# Patient Record
Sex: MALE | Race: WHITE | NOT HISPANIC OR LATINO
[De-identification: names, ages, dates, MRNs, and addresses within clinical notes are randomized per-mention and may not be internally consistent; named-entity substitution may affect disease eponyms.]

---

## 2017-01-04 ENCOUNTER — MEDICATION RENEWAL (OUTPATIENT)
Age: 62
End: 2017-01-04

## 2017-03-09 ENCOUNTER — APPOINTMENT (OUTPATIENT)
Dept: ENDOCRINOLOGY | Facility: CLINIC | Age: 62
End: 2017-03-09

## 2017-03-09 VITALS
OXYGEN SATURATION: 97 % | HEART RATE: 86 BPM | HEIGHT: 70 IN | BODY MASS INDEX: 32.64 KG/M2 | WEIGHT: 228 LBS | SYSTOLIC BLOOD PRESSURE: 126 MMHG | DIASTOLIC BLOOD PRESSURE: 70 MMHG

## 2017-03-15 ENCOUNTER — RX RENEWAL (OUTPATIENT)
Age: 62
End: 2017-03-15

## 2017-04-04 ENCOUNTER — RX RENEWAL (OUTPATIENT)
Age: 62
End: 2017-04-04

## 2017-05-09 ENCOUNTER — RX RENEWAL (OUTPATIENT)
Age: 62
End: 2017-05-09

## 2017-05-11 ENCOUNTER — APPOINTMENT (OUTPATIENT)
Dept: ENDOCRINOLOGY | Facility: CLINIC | Age: 62
End: 2017-05-11

## 2017-05-12 ENCOUNTER — APPOINTMENT (OUTPATIENT)
Dept: INTERNAL MEDICINE | Facility: CLINIC | Age: 62
End: 2017-05-12

## 2017-05-12 DIAGNOSIS — D18.01 HEMANGIOMA OF SKIN AND SUBCUTANEOUS TISSUE: ICD-10-CM

## 2017-06-01 ENCOUNTER — MEDICATION RENEWAL (OUTPATIENT)
Age: 62
End: 2017-06-01

## 2017-07-17 ENCOUNTER — MEDICATION RENEWAL (OUTPATIENT)
Age: 62
End: 2017-07-17

## 2017-07-24 ENCOUNTER — MEDICATION RENEWAL (OUTPATIENT)
Age: 62
End: 2017-07-24

## 2017-07-24 RX ORDER — LABETALOL HYDROCHLORIDE 200 MG/1
200 TABLET, FILM COATED ORAL 3 TIMES DAILY
Qty: 270 | Refills: 3 | Status: DISCONTINUED | COMMUNITY
Start: 2017-05-12 | End: 2017-07-24

## 2017-10-04 LAB
ALBUMIN SERPL ELPH-MCNC: 4.8 G/DL
ALP BLD-CCNC: 58 U/L
ALT SERPL-CCNC: 32 U/L
ANION GAP SERPL CALC-SCNC: 15 MMOL/L
APPEARANCE: CLEAR
AST SERPL-CCNC: 19 U/L
BACTERIA: NEGATIVE
BASOPHILS # BLD AUTO: 0.06 K/UL
BASOPHILS NFR BLD AUTO: 0.6 %
BILIRUB SERPL-MCNC: 0.5 MG/DL
BILIRUBIN URINE: NEGATIVE
BLOOD URINE: NEGATIVE
BUN SERPL-MCNC: 13 MG/DL
CALCIUM SERPL-MCNC: 10 MG/DL
CHLORIDE SERPL-SCNC: 102 MMOL/L
CHOLEST SERPL-MCNC: 125 MG/DL
CHOLEST/HDLC SERPL: 3.8 RATIO
CO2 SERPL-SCNC: 24 MMOL/L
COLOR: YELLOW
CREAT SERPL-MCNC: 1.06 MG/DL
EOSINOPHIL # BLD AUTO: 0.3 K/UL
EOSINOPHIL NFR BLD AUTO: 2.9 %
GLUCOSE QUALITATIVE U: NEGATIVE MG/DL
GLUCOSE SERPL-MCNC: 166 MG/DL
HCT VFR BLD CALC: 43.2 %
HDLC SERPL-MCNC: 33 MG/DL
HGB BLD-MCNC: 14.2 G/DL
HYALINE CASTS: 1 /LPF
IMM GRANULOCYTES NFR BLD AUTO: 0.3 %
KETONES URINE: NEGATIVE
LDLC SERPL CALC-MCNC: 66 MG/DL
LEUKOCYTE ESTERASE URINE: NEGATIVE
LYMPHOCYTES # BLD AUTO: 3.15 K/UL
LYMPHOCYTES NFR BLD AUTO: 30.9 %
MAN DIFF?: NORMAL
MCHC RBC-ENTMCNC: 28.2 PG
MCHC RBC-ENTMCNC: 32.9 GM/DL
MCV RBC AUTO: 85.7 FL
MICROSCOPIC-UA: NORMAL
MONOCYTES # BLD AUTO: 0.97 K/UL
MONOCYTES NFR BLD AUTO: 9.5 %
NEUTROPHILS # BLD AUTO: 5.7 K/UL
NEUTROPHILS NFR BLD AUTO: 55.8 %
NITRITE URINE: NEGATIVE
PH URINE: 5
PLATELET # BLD AUTO: 329 K/UL
POTASSIUM SERPL-SCNC: 4.7 MMOL/L
PROT SERPL-MCNC: 7.5 G/DL
PROTEIN URINE: NEGATIVE MG/DL
RBC # BLD: 5.04 M/UL
RBC # FLD: 13.7 %
RED BLOOD CELLS URINE: 5 /HPF
SODIUM SERPL-SCNC: 141 MMOL/L
SPECIFIC GRAVITY URINE: 1.02
SQUAMOUS EPITHELIAL CELLS: 0 /HPF
TRIGL SERPL-MCNC: 131 MG/DL
UROBILINOGEN URINE: NEGATIVE MG/DL
WBC # FLD AUTO: 10.21 K/UL
WHITE BLOOD CELLS URINE: 1 /HPF

## 2017-10-05 ENCOUNTER — APPOINTMENT (OUTPATIENT)
Dept: INTERNAL MEDICINE | Facility: CLINIC | Age: 62
End: 2017-10-05
Payer: COMMERCIAL

## 2017-10-05 VITALS
BODY MASS INDEX: 32.93 KG/M2 | OXYGEN SATURATION: 97 % | SYSTOLIC BLOOD PRESSURE: 134 MMHG | RESPIRATION RATE: 16 BRPM | DIASTOLIC BLOOD PRESSURE: 80 MMHG | WEIGHT: 230 LBS | HEART RATE: 80 BPM | HEIGHT: 70 IN | TEMPERATURE: 98 F

## 2017-10-05 DIAGNOSIS — Z86.69 PERSONAL HISTORY OF OTHER DISEASES OF THE NERVOUS SYSTEM AND SENSE ORGANS: ICD-10-CM

## 2017-10-05 DIAGNOSIS — Z86.73 PERSONAL HISTORY OF TRANSIENT ISCHEMIC ATTACK (TIA), AND CEREBRAL INFARCTION W/OUT RESIDUAL DEFICITS: ICD-10-CM

## 2017-10-05 DIAGNOSIS — I61.3 NONTRAUMATIC INTRACEREBRAL HEMORRHAGE IN BRAIN STEM: ICD-10-CM

## 2017-10-05 DIAGNOSIS — R63.5 ABNORMAL WEIGHT GAIN: ICD-10-CM

## 2017-10-05 DIAGNOSIS — Z82.49 FAMILY HISTORY OF ISCHEMIC HEART DISEASE AND OTHER DISEASES OF THE CIRCULATORY SYSTEM: ICD-10-CM

## 2017-10-05 LAB
25(OH)D3 SERPL-MCNC: 24.6 NG/ML
HBA1C MFR BLD HPLC: 8.2 %
PSA SERPL-MCNC: 0.35 NG/ML
TSH SERPL-ACNC: 2.59 UIU/ML

## 2017-10-05 PROCEDURE — 99214 OFFICE O/P EST MOD 30 MIN: CPT

## 2017-10-05 RX ORDER — HYDROCHLOROTHIAZIDE 25 MG/1
25 TABLET ORAL DAILY
Qty: 30 | Refills: 3 | Status: DISCONTINUED | COMMUNITY
Start: 2017-03-09 | End: 2017-10-05

## 2017-10-19 ENCOUNTER — MEDICATION RENEWAL (OUTPATIENT)
Age: 62
End: 2017-10-19

## 2017-10-19 ENCOUNTER — APPOINTMENT (OUTPATIENT)
Dept: ENDOCRINOLOGY | Facility: CLINIC | Age: 62
End: 2017-10-19
Payer: COMMERCIAL

## 2017-10-19 VITALS
HEART RATE: 83 BPM | SYSTOLIC BLOOD PRESSURE: 120 MMHG | WEIGHT: 230 LBS | DIASTOLIC BLOOD PRESSURE: 70 MMHG | BODY MASS INDEX: 32.93 KG/M2 | HEIGHT: 70 IN | OXYGEN SATURATION: 98 %

## 2017-10-19 PROCEDURE — 99214 OFFICE O/P EST MOD 30 MIN: CPT

## 2017-10-23 ENCOUNTER — MEDICATION RENEWAL (OUTPATIENT)
Age: 62
End: 2017-10-23

## 2017-11-01 ENCOUNTER — RX RENEWAL (OUTPATIENT)
Age: 62
End: 2017-11-01

## 2018-02-01 ENCOUNTER — APPOINTMENT (OUTPATIENT)
Dept: INTERNAL MEDICINE | Facility: CLINIC | Age: 63
End: 2018-02-01
Payer: COMMERCIAL

## 2018-02-01 VITALS
RESPIRATION RATE: 16 BRPM | OXYGEN SATURATION: 97 % | HEART RATE: 78 BPM | WEIGHT: 229 LBS | TEMPERATURE: 99 F | DIASTOLIC BLOOD PRESSURE: 76 MMHG | BODY MASS INDEX: 32.78 KG/M2 | SYSTOLIC BLOOD PRESSURE: 140 MMHG | HEIGHT: 70 IN

## 2018-02-01 VITALS — SYSTOLIC BLOOD PRESSURE: 120 MMHG | DIASTOLIC BLOOD PRESSURE: 70 MMHG

## 2018-02-01 DIAGNOSIS — Z28.21 IMMUNIZATION NOT CARRIED OUT BECAUSE OF PATIENT REFUSAL: ICD-10-CM

## 2018-02-01 PROCEDURE — 99214 OFFICE O/P EST MOD 30 MIN: CPT

## 2018-02-01 RX ORDER — METFORMIN HYDROCHLORIDE 500 MG/1
500 TABLET, COATED ORAL
Qty: 180 | Refills: 0 | Status: DISCONTINUED | COMMUNITY
Start: 2017-06-01

## 2018-02-22 ENCOUNTER — APPOINTMENT (OUTPATIENT)
Dept: ENDOCRINOLOGY | Facility: CLINIC | Age: 63
End: 2018-02-22
Payer: COMMERCIAL

## 2018-02-22 VITALS
SYSTOLIC BLOOD PRESSURE: 122 MMHG | HEIGHT: 70 IN | HEART RATE: 79 BPM | OXYGEN SATURATION: 98 % | BODY MASS INDEX: 33.21 KG/M2 | DIASTOLIC BLOOD PRESSURE: 82 MMHG | WEIGHT: 232 LBS

## 2018-02-22 LAB
GLUCOSE BLDC GLUCOMTR-MCNC: 148
HBA1C MFR BLD HPLC: 7

## 2018-02-22 PROCEDURE — 82962 GLUCOSE BLOOD TEST: CPT

## 2018-02-22 PROCEDURE — 83036 HEMOGLOBIN GLYCOSYLATED A1C: CPT | Mod: QW

## 2018-02-22 PROCEDURE — 99214 OFFICE O/P EST MOD 30 MIN: CPT | Mod: 25

## 2018-03-19 ENCOUNTER — RX RENEWAL (OUTPATIENT)
Age: 63
End: 2018-03-19

## 2018-04-16 ENCOUNTER — RX RENEWAL (OUTPATIENT)
Age: 63
End: 2018-04-16

## 2018-04-23 ENCOUNTER — RX RENEWAL (OUTPATIENT)
Age: 63
End: 2018-04-23

## 2018-05-07 ENCOUNTER — RX RENEWAL (OUTPATIENT)
Age: 63
End: 2018-05-07

## 2018-05-14 ENCOUNTER — RX RENEWAL (OUTPATIENT)
Age: 63
End: 2018-05-14

## 2018-06-18 ENCOUNTER — RX RENEWAL (OUTPATIENT)
Age: 63
End: 2018-06-18

## 2018-07-19 ENCOUNTER — APPOINTMENT (OUTPATIENT)
Dept: ENDOCRINOLOGY | Facility: CLINIC | Age: 63
End: 2018-07-19

## 2018-07-25 ENCOUNTER — RX RENEWAL (OUTPATIENT)
Age: 63
End: 2018-07-25

## 2018-07-31 ENCOUNTER — RX RENEWAL (OUTPATIENT)
Age: 63
End: 2018-07-31

## 2018-10-01 ENCOUNTER — RX RENEWAL (OUTPATIENT)
Age: 63
End: 2018-10-01

## 2018-10-08 ENCOUNTER — RX RENEWAL (OUTPATIENT)
Age: 63
End: 2018-10-08

## 2018-11-08 ENCOUNTER — APPOINTMENT (OUTPATIENT)
Dept: ENDOCRINOLOGY | Facility: CLINIC | Age: 63
End: 2018-11-08
Payer: COMMERCIAL

## 2018-11-08 VITALS
BODY MASS INDEX: 33.79 KG/M2 | DIASTOLIC BLOOD PRESSURE: 80 MMHG | HEART RATE: 91 BPM | WEIGHT: 236 LBS | SYSTOLIC BLOOD PRESSURE: 120 MMHG | HEIGHT: 70 IN | OXYGEN SATURATION: 97 %

## 2018-11-08 LAB
GLUCOSE BLDC GLUCOMTR-MCNC: 250
HBA1C MFR BLD HPLC: 9.8

## 2018-11-08 PROCEDURE — 99214 OFFICE O/P EST MOD 30 MIN: CPT | Mod: 25

## 2018-11-08 PROCEDURE — 82962 GLUCOSE BLOOD TEST: CPT

## 2018-11-08 PROCEDURE — 83036 HEMOGLOBIN GLYCOSYLATED A1C: CPT | Mod: QW

## 2018-11-08 RX ORDER — BLOOD-GLUCOSE METER
W/DEVICE EACH MISCELLANEOUS
Qty: 1 | Refills: 0 | Status: ACTIVE | COMMUNITY
Start: 2018-11-08 | End: 1900-01-01

## 2018-11-08 RX ORDER — BLOOD SUGAR DIAGNOSTIC
STRIP MISCELLANEOUS
Qty: 1 | Refills: 3 | Status: ACTIVE | COMMUNITY
Start: 2018-11-08 | End: 1900-01-01

## 2018-12-05 ENCOUNTER — RX RENEWAL (OUTPATIENT)
Age: 63
End: 2018-12-05

## 2018-12-17 ENCOUNTER — RX RENEWAL (OUTPATIENT)
Age: 63
End: 2018-12-17

## 2019-02-20 ENCOUNTER — RX RENEWAL (OUTPATIENT)
Age: 64
End: 2019-02-20

## 2019-02-21 ENCOUNTER — APPOINTMENT (OUTPATIENT)
Dept: ENDOCRINOLOGY | Facility: CLINIC | Age: 64
End: 2019-02-21

## 2019-04-16 ENCOUNTER — RX RENEWAL (OUTPATIENT)
Age: 64
End: 2019-04-16

## 2019-05-06 ENCOUNTER — RX RENEWAL (OUTPATIENT)
Age: 64
End: 2019-05-06

## 2019-05-16 ENCOUNTER — RX RENEWAL (OUTPATIENT)
Age: 64
End: 2019-05-16

## 2019-05-23 ENCOUNTER — RX RENEWAL (OUTPATIENT)
Age: 64
End: 2019-05-23

## 2019-06-17 ENCOUNTER — RX RENEWAL (OUTPATIENT)
Age: 64
End: 2019-06-17

## 2019-06-18 ENCOUNTER — RX RENEWAL (OUTPATIENT)
Age: 64
End: 2019-06-18

## 2019-07-11 ENCOUNTER — APPOINTMENT (OUTPATIENT)
Dept: ENDOCRINOLOGY | Facility: CLINIC | Age: 64
End: 2019-07-11
Payer: COMMERCIAL

## 2019-07-11 ENCOUNTER — RESULT CHARGE (OUTPATIENT)
Age: 64
End: 2019-07-11

## 2019-07-11 VITALS
BODY MASS INDEX: 32.65 KG/M2 | HEIGHT: 70 IN | OXYGEN SATURATION: 97 % | DIASTOLIC BLOOD PRESSURE: 90 MMHG | WEIGHT: 228.05 LBS | HEART RATE: 80 BPM | SYSTOLIC BLOOD PRESSURE: 144 MMHG

## 2019-07-11 LAB
GLUCOSE BLDC GLUCOMTR-MCNC: 155
HBA1C MFR BLD HPLC: 8.8

## 2019-07-11 PROCEDURE — 83036 HEMOGLOBIN GLYCOSYLATED A1C: CPT | Mod: QW

## 2019-07-11 PROCEDURE — 99214 OFFICE O/P EST MOD 30 MIN: CPT | Mod: 25

## 2019-07-11 PROCEDURE — 82962 GLUCOSE BLOOD TEST: CPT

## 2019-07-12 LAB
ALBUMIN SERPL ELPH-MCNC: 5.1 G/DL
ALP BLD-CCNC: 54 U/L
ALT SERPL-CCNC: 33 U/L
ANION GAP SERPL CALC-SCNC: 16 MMOL/L
AST SERPL-CCNC: 18 U/L
BILIRUB SERPL-MCNC: 0.5 MG/DL
BUN SERPL-MCNC: 18 MG/DL
CALCIUM SERPL-MCNC: 10.3 MG/DL
CHLORIDE SERPL-SCNC: 102 MMOL/L
CO2 SERPL-SCNC: 24 MMOL/L
CREAT SERPL-MCNC: 0.89 MG/DL
CREAT SPEC-SCNC: 61 MG/DL
GLUCOSE SERPL-MCNC: 137 MG/DL
MICROALBUMIN 24H UR DL<=1MG/L-MCNC: 3.2 MG/DL
MICROALBUMIN/CREAT 24H UR-RTO: 52 MG/G
POTASSIUM SERPL-SCNC: 4.6 MMOL/L
PROT SERPL-MCNC: 7.3 G/DL
SODIUM SERPL-SCNC: 142 MMOL/L

## 2019-07-12 NOTE — PHYSICAL EXAM
[Alert] : alert [No Acute Distress] : no acute distress [No Respiratory Distress] : no respiratory distress [Normal Rate and Effort] : normal respiratory rhythm and effort [Clear to Auscultation] : lungs were clear to auscultation bilaterally [Normal S1, S2] : normal S1 and S2 [Normal Rate] : heart rate was normal  [Regular Rhythm] : with a regular rhythm [Normal Bowel Sounds] : normal bowel sounds [Not Tender] : non-tender [Soft] : abdomen soft [Not Distended] : not distended [Right Foot Was Examined] : right foot ~C was examined [Left Foot Was Examined] : left foot ~C was examined [Normal] : normal [2+] : 2+ in the dorsalis pedis [Diminished Throughout Both Feet] : normal tactile sensation with monofilament testing throughout both feet

## 2019-07-12 NOTE — ASSESSMENT
[FreeTextEntry1] : 62 yo male with hx of t2dm uncontrolled (HbA1C 8.8%) with hx of CVA here for f/u.\par 1) T2DM: Continue trulicity 1.5mg po sq qweekly and metformin to 850mg po bid.\par Increase farxiga 10mg po qdaily.\par Test daily.\par Stop eating after 8pm.\par Labs/optho up to date.\par 2) HTN: BP slightly above goal. Continue Norvasc/hydralazine/labetalol.\par 3) Dyslipidemia: continue statin therapy.\par 4) Vit D Insufficiency: start Vit D 1000mg po qdaily. \par Pt declines labs today. He will have them when he sees his PCP Dr. Reyes, but I need to know if his CrCl is good so he will do a CMP and urine microalbumin.\par Return in 4 months.

## 2019-07-12 NOTE — HISTORY OF PRESENT ILLNESS
[FreeTextEntry1] : 64 yo male with t2dm controlled diagnosed last year (Hb A1C is 8.8%) with cva here for hospital f/u. He is currently takes farxiga 5mg po qdaily, trulicity 1.5mg sq qweekly and metformin 850mg po bid. He is inquiring about nucentrix which he got as an ad on facebook.\par Last saw optho 4/19 - no "damage to eyes". No blurred vision/diplolpia. No cp/palpations/Sob. \par \par In December, he lost his garza retriever of 13 years and goes back to work 3x/week as a . His son has decided to go to law school at [employer] starting next month and his other son is in the academy for [employer]. Granddaughter is 2 now!\par \par

## 2019-07-12 NOTE — ADDENDUM
[FreeTextEntry1] : 7/12 2PM: left a msg that his renal function is normal and he has small amounts of protein in the urine but I don't have a previous one for comparison.

## 2019-07-22 ENCOUNTER — RX RENEWAL (OUTPATIENT)
Age: 64
End: 2019-07-22

## 2019-10-06 ENCOUNTER — RX RENEWAL (OUTPATIENT)
Age: 64
End: 2019-10-06

## 2019-11-21 ENCOUNTER — APPOINTMENT (OUTPATIENT)
Dept: ENDOCRINOLOGY | Facility: CLINIC | Age: 64
End: 2019-11-21
Payer: COMMERCIAL

## 2019-11-21 VITALS
OXYGEN SATURATION: 97 % | WEIGHT: 226 LBS | SYSTOLIC BLOOD PRESSURE: 140 MMHG | HEART RATE: 81 BPM | DIASTOLIC BLOOD PRESSURE: 82 MMHG | HEIGHT: 70 IN | BODY MASS INDEX: 32.35 KG/M2

## 2019-11-21 LAB
GLUCOSE BLDC GLUCOMTR-MCNC: 263
HBA1C MFR BLD HPLC: 7.8

## 2019-11-21 PROCEDURE — 82962 GLUCOSE BLOOD TEST: CPT

## 2019-11-21 PROCEDURE — 99214 OFFICE O/P EST MOD 30 MIN: CPT | Mod: 25

## 2019-11-21 PROCEDURE — 83036 HEMOGLOBIN GLYCOSYLATED A1C: CPT | Mod: QW

## 2019-11-22 LAB
ALBUMIN SERPL ELPH-MCNC: 5.2 G/DL
ALP BLD-CCNC: 61 U/L
ALT SERPL-CCNC: 29 U/L
ANION GAP SERPL CALC-SCNC: 16 MMOL/L
AST SERPL-CCNC: 18 U/L
BILIRUB SERPL-MCNC: 0.2 MG/DL
BUN SERPL-MCNC: 18 MG/DL
CALCIUM SERPL-MCNC: 10.5 MG/DL
CHLORIDE SERPL-SCNC: 101 MMOL/L
CHOLEST SERPL-MCNC: 139 MG/DL
CHOLEST/HDLC SERPL: 4 RATIO
CO2 SERPL-SCNC: 26 MMOL/L
CREAT SERPL-MCNC: 0.99 MG/DL
CREAT SPEC-SCNC: 48 MG/DL
GLUCOSE SERPL-MCNC: 217 MG/DL
HDLC SERPL-MCNC: 35 MG/DL
LDLC SERPL CALC-MCNC: 63 MG/DL
MICROALBUMIN 24H UR DL<=1MG/L-MCNC: <1.2 MG/DL
MICROALBUMIN/CREAT 24H UR-RTO: NORMAL MG/G
POTASSIUM SERPL-SCNC: 4.7 MMOL/L
PROT SERPL-MCNC: 7.5 G/DL
SODIUM SERPL-SCNC: 143 MMOL/L
TRIGL SERPL-MCNC: 204 MG/DL
TSH SERPL-ACNC: 2.12 UIU/ML

## 2019-11-22 NOTE — ASSESSMENT
[FreeTextEntry1] : 65 yo male with hx of t2dm uncontrolled (HbA1C 7.8%) with hx of CVA here for f/u.\par 1) T2DM: Continue trulicity 1.5mg po sq qweekly, metformin to 850mg po bid, and Farxiga 10mg po qdaily.\par Test daily.\par Last saw optho 4/19 but need the report: Dr. Monroy\par Refused flu shot.\par 2) HTN: BP slightly above goal at 140/82. Will reach out to his PCP Dr. Reyes. Continue to take:  Norvasc/hydralazine/labetalol/lisinopril. \par 3) Dyslipidemia: continue statin therapy.\par Yearly labs done today.\par Return in 4 months.

## 2019-11-22 NOTE — ADDENDUM
[FreeTextEntry1] : 11/22/19 1130am called patient to review labs:\par 1) Lipids are good. TG are elevated to 205 but he ate at 9am and had labs about noon so not truly fasting.\par 2) Normal kidney and liver function\par 3) Serum glucose elevated as was protein, would continue to watch - had been 5.1 in July and now 5.2 - will alert Dr. Reyes to this.\par

## 2019-11-22 NOTE — HISTORY OF PRESENT ILLNESS
"Chief Complaint   Patient presents with     RECHECK     results and cpap f/u       Initial There were no vitals taken for this visit. Estimated body mass index is 43.27 kg/(m^2) as calculated from the following:    Height as of 12/28/16: 1.651 m (5' 5\").    Weight as of 12/28/16: 117.9 kg (260 lb).  Medication Reconciliation: complete    " [FreeTextEntry1] : 65 yo male with t2dm controlled diagnosed last year (Hb A1C is 7.8%) with cva here for hospital f/u. He is currently takes farxiga 10mg po qdaily, trulicity 1.5mg sq qweekly and metformin 850mg po bid. He does not test. He feels that the farxiga has helped him with better glycemic control. Weight has decreased by 10 pounds since 11/18.\par He denies dysuria or hematuria.\par He has completed his temporary  job 3x/week. He His older son is now in [employer] school and is younger son is now a [employer] . Granddaughter is 2.5 now! They now have a 75 pound Luis Retriever puppy Annie which will keep him busy.

## 2020-01-31 ENCOUNTER — RX RENEWAL (OUTPATIENT)
Age: 65
End: 2020-01-31

## 2020-02-26 ENCOUNTER — EMERGENCY (EMERGENCY)
Facility: HOSPITAL | Age: 65
LOS: 1 days | Discharge: HOME | End: 2020-02-26
Attending: EMERGENCY MEDICINE | Admitting: EMERGENCY MEDICINE
Payer: COMMERCIAL

## 2020-02-26 VITALS
SYSTOLIC BLOOD PRESSURE: 154 MMHG | HEIGHT: 70 IN | HEART RATE: 73 BPM | DIASTOLIC BLOOD PRESSURE: 90 MMHG | TEMPERATURE: 97 F | OXYGEN SATURATION: 97 % | WEIGHT: 214.95 LBS | RESPIRATION RATE: 18 BRPM

## 2020-02-26 DIAGNOSIS — Z85.828 PERSONAL HISTORY OF OTHER MALIGNANT NEOPLASM OF SKIN: Chronic | ICD-10-CM

## 2020-02-26 PROCEDURE — 12001 RPR S/N/AX/GEN/TRNK 2.5CM/<: CPT

## 2020-02-26 PROCEDURE — 12001 RPR S/N/AX/GEN/TRNK 2.5CM/<: CPT | Mod: F5

## 2020-02-26 PROCEDURE — 99283 EMERGENCY DEPT VISIT LOW MDM: CPT | Mod: 25

## 2020-02-26 PROCEDURE — 90715 TDAP VACCINE 7 YRS/> IM: CPT

## 2020-02-26 PROCEDURE — 90471 IMMUNIZATION ADMIN: CPT

## 2020-02-26 RX ORDER — TETANUS TOXOID, REDUCED DIPHTHERIA TOXOID AND ACELLULAR PERTUSSIS VACCINE, ADSORBED 5; 2.5; 8; 8; 2.5 [IU]/.5ML; [IU]/.5ML; UG/.5ML; UG/.5ML; UG/.5ML
0.5 SUSPENSION INTRAMUSCULAR ONCE
Refills: 0 | Status: COMPLETED | OUTPATIENT
Start: 2020-02-26 | End: 2020-02-26

## 2020-02-26 RX ADMIN — Medication 1 TABLET(S): at 09:27

## 2020-02-26 RX ADMIN — TETANUS TOXOID, REDUCED DIPHTHERIA TOXOID AND ACELLULAR PERTUSSIS VACCINE, ADSORBED 0.5 MILLILITER(S): 5; 2.5; 8; 8; 2.5 SUSPENSION INTRAMUSCULAR at 09:27

## 2020-02-26 NOTE — ED ADULT NURSE NOTE - OBJECTIVE STATEMENT
Pt presents to ED from home with c/o lacerations to right thumb. Pt states he cut his finger on the garage door. Denies any blood thinners. Bleeding controlled.

## 2020-02-26 NOTE — ED PROVIDER NOTE - PATIENT PORTAL LINK FT
You can access the FollowMyHealth Patient Portal offered by [facility] by registering at the following website: [email or web address]. By joining [facility]’s FollowMyHealth portal, you will also be able to view your health information using other applications (apps) compatible with our system.

## 2020-02-26 NOTE — ED PROVIDER NOTE - NSFOLLOWUPINSTRUCTIONS_ED_ALL_ED_FT
1.Follow up with your PMD within 48-72 hours for wound check.   2. Keep sutures clean and dry for 24 hours then clean with soap and water daily.    3. Apply bacitracin and cover.   4. Return to the emergency department for suture removal in 7 days.   5. Return to the ED for increased pain, surrounding redness, streaking (red lines), pus, drainage from wound, swelling, fever, chills OR ANY NEW CONCERNING symptoms.   6. You received a Tdap (tetanus, diphtheria and pertussis) shot today, which can make your arm sore.    Finger Laceration    WHAT YOU NEED TO KNOW:    What is a finger laceration? A finger laceration is a deep cut in your skin. Your blood vessels, bones, joints, tendons, or nerves may also be injured.    What are the signs and symptoms of a finger laceration? Your symptoms may depend on whether nerves, tendons, or deeper tissues were injured. You may have any of the following:     A cut, tear, or gash in your finger      Bleeding, swelling, or pain      Numbness or tingling in your finger      Trouble moving your finger    How is a finger laceration diagnosed? Tell your healthcare provider how you got your laceration. Your healthcare provider will examine your laceration and decide what treatment you need. An x-ray, ultrasound, or CT may show foreign objects in the wound. Foreign objects include metal, gravel, and glass. The tests may also show damage to deeper tissues. You may be given contrast liquid to help the injured area show up better in the pictures. Tell the healthcare provider if you have ever had an allergic reaction to contrast liquid.    How is a finger laceration treated? Treatment depends on how large and deep the laceration is. It also depends on whether you have damage to deeper tissues. You may need any of the following:     Pressure may be applied to stop any bleeding.      Wound cleaning may be needed to remove dirt or debris. This will decrease the risk of infection. Your healthcare provider may need to look in your laceration for foreign objects or damage to deeper tissues. Before your laceration is cleaned and checked, you may be given medicine to numb the area. You may also be given medicine to help you relax.      Wound closure with stitches, medical glue, or Steri-Strips™ may be needed. These help the wound close and heal. A splint may be placed over your stitches, glue, or Steri-Strips. This will help decrease stress on the wound and prevent it from coming apart.           Medicine may be given to treat pain or decrease your risk for infection. You may also be given a tetanus shot. Your healthcare provider will decide if you need a tetanus shot. Wounds at high risk for tetanus infection include wounds with dirt or saliva in them. Tell your healthcare provider if you have had the tetanus vaccine or a booster within the last 5 years.      Surgery may be needed to clean your wound and remove foreign objects. Surgery may also be needed to repair injuries to tendons, nerves, or bones.    What can I do to manage my symptoms?     Apply ice on your finger for 15 to 20 minutes every hour or as directed. Use an ice pack, or put crushed ice in a plastic bag. Cover it with a towel before you apply it to your skin. Ice helps prevent tissue damage and decreases swelling and pain.      Elevate your hand above the level of your heart as often as you can. This will help decrease swelling and pain. Prop your hand on pillows or blankets to keep it elevated comfortably.      Wear your splint as directed. A splint will decrease movement and stress on your wound. The splint may help your wound heal faster. Ask your healthcare provider how to apply and remove a splint.      Apply ointments to decrease scarring. Do not apply ointments until your healthcare provider says it is okay. You may need to wait until your wound is healed. Ask which ointment to buy and how often to use it.    When should I seek immediate care?     Your wound comes apart.      Blood soaks through your bandage.      You have severe pain in your finger or hand.      Your finger is pale and cold.      You have sudden trouble moving your finger.      Your swelling suddenly gets worse.      You have red streaks on your skin coming from your wound.    When should I call my doctor or hand specialist?     You have new numbness or tingling.      Your finger feels warm, looks swollen or red, and is draining pus.      You have a fever.      You have questions or concerns about your condition or care.    CARE AGREEMENT:    You have the right to help plan your care. Learn about your health condition and how it may be treated. Discuss treatment options with your healthcare providers to decide what care you want to receive. You always have the right to refuse treatment.

## 2020-02-26 NOTE — ED PROVIDER NOTE - PHYSICAL EXAMINATION
laceration- 2.5 cm laceration to distal 1st right finger, tendon intact, positive ROM, positive strength and sensation

## 2020-02-26 NOTE — ED PROCEDURE NOTE - ATTENDING CONTRIBUTION TO CARE
Dr. Cabrera: I performed a face to face bedside interview with patient regarding history of present illness, review of symptoms and past medical history. I completed an independent physical exam.  I have discussed patient's plan of care with PA.   I agree with note as stated above, having amended the EMR as needed to reflect my findings.   This includes HISTORY OF PRESENT ILLNESS, HIV, PAST MEDICAL/SURGICAL/FAMILY/SOCIAL HISTORY, ALLERGIES AND HOME MEDICATIONS, REVIEW OF SYSTEMS, PHYSICAL EXAM, and any PROGRESS NOTES during the time I functioned as the attending physician for this patient.

## 2020-02-26 NOTE — ED PROVIDER NOTE - OBJECTIVE STATEMENT
64 yr old male with hx of CVA, DM, HTN presents with laceration to right thumb, s/p cutting finger on garage door this morning, prior to arrival. Tetanus over 10 years ago. No other injuries. Right hand dominant.

## 2020-02-26 NOTE — ED PROVIDER NOTE - CLINICAL SUMMARY MEDICAL DECISION MAKING FREE TEXT BOX
64 yr old male with hx of CVA, DM, HTN presents with laceration to right thumb, s/p cutting finger on garage door this morning, prior to arrival. Tetanus over 10 years ago. No other injuries. Right hand dominant.  laceration- 2.5 cm laceration to distal 1st right finger, tendon intact, positive ROM, positive strength and sensation   wound care, sutures placed, pt tolerated, stable for dc with Augmentin, fu in 7 days for suture removal. tetanus given.

## 2020-02-26 NOTE — ED PROVIDER NOTE - ATTENDING CONTRIBUTION TO CARE
Dr. Cabrera: I performed a face to face bedside interview with patient regarding history of present illness, review of symptoms and past medical history. I completed an independent physical exam.  I have discussed patient's plan of care with PA.   I agree with note as stated above, having amended the EMR as needed to reflect my findings.   This includes HISTORY OF PRESENT ILLNESS, HIV, PAST MEDICAL/SURGICAL/FAMILY/SOCIAL HISTORY, ALLERGIES AND HOME MEDICATIONS, REVIEW OF SYSTEMS, PHYSICAL EXAM, and any PROGRESS NOTES during the time I functioned as the attending physician for this patient.    64M h/o DM, right hand dominant, tdap not utd, p/w lac to right thumb s/p garage door, no compression injury. On exam pt is well appearing, nad, 2 cm lac over mid part of right thumb, neurovasc intact. Lac repaired, tdap updated and augmentin given.

## 2020-02-26 NOTE — ED ADULT TRIAGE NOTE - TEMPERATURE IN FAHRENHEIT (DEGREES F)
Date Performed: 08/16/2017       Time Performed: 12:00:18

 

DOCTOR:      Kameron Ma 

 

DRUG LIST:     

CLINICAL HISTORY:     

REASON FOR TEST:     

REASON FOR ENDING:     

OBSERVATION:     

CONCLUSION:      Lexiscan stress test was performed under standard four minute protocol.  Radionuclid
e was injected one minute prior to ending the test. No electrocardiographic abormalities were present
 to suggest ischemia. Nuclear imaging and interpretation are pending.

COMMENTS: 97.4 05-May-2018 20:44

## 2020-02-29 PROBLEM — I63.9 CEREBRAL INFARCTION, UNSPECIFIED: Chronic | Status: ACTIVE | Noted: 2020-02-26

## 2020-02-29 PROBLEM — E11.9 TYPE 2 DIABETES MELLITUS WITHOUT COMPLICATIONS: Chronic | Status: ACTIVE | Noted: 2020-02-26

## 2020-03-02 DIAGNOSIS — S61.011A LACERATION WITHOUT FOREIGN BODY OF RIGHT THUMB WITHOUT DAMAGE TO NAIL, INITIAL ENCOUNTER: ICD-10-CM

## 2020-03-06 ENCOUNTER — APPOINTMENT (OUTPATIENT)
Dept: INTERNAL MEDICINE | Facility: CLINIC | Age: 65
End: 2020-03-06
Payer: COMMERCIAL

## 2020-03-06 VITALS
BODY MASS INDEX: 32.35 KG/M2 | SYSTOLIC BLOOD PRESSURE: 140 MMHG | HEART RATE: 85 BPM | TEMPERATURE: 97.5 F | WEIGHT: 226 LBS | DIASTOLIC BLOOD PRESSURE: 70 MMHG | HEIGHT: 70 IN | OXYGEN SATURATION: 98 % | RESPIRATION RATE: 16 BRPM

## 2020-03-06 DIAGNOSIS — M19.049 PRIMARY OSTEOARTHRITIS, UNSPECIFIED HAND: ICD-10-CM

## 2020-03-06 DIAGNOSIS — Z00.00 ENCOUNTER FOR GENERAL ADULT MEDICAL EXAMINATION W/OUT ABNORMAL FINDINGS: ICD-10-CM

## 2020-03-06 DIAGNOSIS — S61.219A LACERATION W/OUT FOREIGN BODY OF UNSPECIFIED FINGER W/OUT DAMAGE TO NAIL, INITIAL ENCOUNTER: ICD-10-CM

## 2020-03-06 PROCEDURE — 99214 OFFICE O/P EST MOD 30 MIN: CPT

## 2020-03-06 NOTE — HISTORY OF PRESENT ILLNESS
[FreeTextEntry1] : Laceration ED Follow up \par HTN\par Needs labs [de-identified] : Right thumb laceration went to hospital \par -tetanus shot\par -stitches\par -

## 2020-03-21 DIAGNOSIS — R05 COUGH: ICD-10-CM

## 2020-03-27 ENCOUNTER — INPATIENT (INPATIENT)
Facility: HOSPITAL | Age: 65
LOS: 1 days | Discharge: HOME | DRG: 871 | End: 2020-03-29
Attending: FAMILY MEDICINE | Admitting: FAMILY MEDICINE
Payer: COMMERCIAL

## 2020-03-27 VITALS
DIASTOLIC BLOOD PRESSURE: 61 MMHG | TEMPERATURE: 102 F | OXYGEN SATURATION: 94 % | SYSTOLIC BLOOD PRESSURE: 100 MMHG | WEIGHT: 212.08 LBS | HEART RATE: 94 BPM | HEIGHT: 70 IN | RESPIRATION RATE: 18 BRPM

## 2020-03-27 DIAGNOSIS — B34.9 VIRAL INFECTION, UNSPECIFIED: ICD-10-CM

## 2020-03-27 DIAGNOSIS — Z85.828 PERSONAL HISTORY OF OTHER MALIGNANT NEOPLASM OF SKIN: Chronic | ICD-10-CM

## 2020-03-27 LAB
ALBUMIN SERPL ELPH-MCNC: 3.2 G/DL — LOW (ref 3.3–5)
ALP SERPL-CCNC: 49 U/L — SIGNIFICANT CHANGE UP (ref 40–120)
ALT FLD-CCNC: 46 U/L — HIGH (ref 10–45)
ANION GAP SERPL CALC-SCNC: 16 MMOL/L — SIGNIFICANT CHANGE UP (ref 5–17)
APTT BLD: 52.1 SEC — SIGNIFICANT CHANGE UP (ref 27.5–36.3)
AST SERPL-CCNC: 37 U/L — SIGNIFICANT CHANGE UP (ref 10–40)
BASOPHILS # BLD AUTO: 0.03 K/UL — SIGNIFICANT CHANGE UP (ref 0–0.2)
BASOPHILS NFR BLD AUTO: 0.3 % — SIGNIFICANT CHANGE UP (ref 0–2)
BILIRUB SERPL-MCNC: 0.6 MG/DL — SIGNIFICANT CHANGE UP (ref 0.2–1.2)
BUN SERPL-MCNC: 34 MG/DL — HIGH (ref 7–23)
CALCIUM SERPL-MCNC: 9.2 MG/DL — SIGNIFICANT CHANGE UP (ref 8.4–10.5)
CHLORIDE SERPL-SCNC: 99 MMOL/L — SIGNIFICANT CHANGE UP (ref 96–108)
CO2 SERPL-SCNC: 19 MMOL/L — LOW (ref 22–31)
CREAT SERPL-MCNC: 1.55 MG/DL — HIGH (ref 0.5–1.3)
EOSINOPHIL # BLD AUTO: 0.02 K/UL — SIGNIFICANT CHANGE UP (ref 0–0.5)
EOSINOPHIL NFR BLD AUTO: 0.2 % — SIGNIFICANT CHANGE UP (ref 0–6)
GLUCOSE BLDC GLUCOMTR-MCNC: 121 MG/DL — HIGH (ref 70–99)
GLUCOSE SERPL-MCNC: 217 MG/DL — HIGH (ref 70–99)
HCT VFR BLD CALC: 42.7 % — SIGNIFICANT CHANGE UP (ref 39–50)
HGB BLD-MCNC: 13.9 G/DL — SIGNIFICANT CHANGE UP (ref 13–17)
IMM GRANULOCYTES NFR BLD AUTO: 0.7 % — SIGNIFICANT CHANGE UP (ref 0–1.5)
INR BLD: 1.28 RATIO — HIGH (ref 0.88–1.16)
LACTATE SERPL-SCNC: 1 MMOL/L — SIGNIFICANT CHANGE UP (ref 0.7–2)
LYMPHOCYTES # BLD AUTO: 19.8 % — SIGNIFICANT CHANGE UP (ref 13–44)
LYMPHOCYTES # BLD AUTO: 2.04 K/UL — SIGNIFICANT CHANGE UP (ref 1–3.3)
MCHC RBC-ENTMCNC: 28.1 PG — SIGNIFICANT CHANGE UP (ref 27–34)
MCHC RBC-ENTMCNC: 32.6 GM/DL — SIGNIFICANT CHANGE UP (ref 32–36)
MCV RBC AUTO: 86.4 FL — SIGNIFICANT CHANGE UP (ref 80–100)
MONOCYTES # BLD AUTO: 0.93 K/UL — HIGH (ref 0–0.9)
MONOCYTES NFR BLD AUTO: 9 % — SIGNIFICANT CHANGE UP (ref 2–14)
NEUTROPHILS # BLD AUTO: 7.23 K/UL — SIGNIFICANT CHANGE UP (ref 1.8–7.4)
NEUTROPHILS NFR BLD AUTO: 70 % — SIGNIFICANT CHANGE UP (ref 43–77)
NRBC # BLD: 0 /100 WBCS — SIGNIFICANT CHANGE UP (ref 0–0)
PLATELET # BLD AUTO: 365 K/UL — SIGNIFICANT CHANGE UP (ref 150–400)
POTASSIUM SERPL-MCNC: 4.5 MMOL/L — SIGNIFICANT CHANGE UP (ref 3.5–5.3)
POTASSIUM SERPL-SCNC: 4.5 MMOL/L — SIGNIFICANT CHANGE UP (ref 3.5–5.3)
PROT SERPL-MCNC: 7.9 G/DL — SIGNIFICANT CHANGE UP (ref 6–8.3)
PROTHROM AB SERPL-ACNC: 14.4 SEC — HIGH (ref 10–12.9)
RAPID RVP RESULT: SIGNIFICANT CHANGE UP
RBC # BLD: 4.94 M/UL — SIGNIFICANT CHANGE UP (ref 4.2–5.8)
RBC # FLD: 14 % — SIGNIFICANT CHANGE UP (ref 10.3–14.5)
SODIUM SERPL-SCNC: 134 MMOL/L — LOW (ref 135–145)
WBC # BLD: 10.32 K/UL — SIGNIFICANT CHANGE UP (ref 3.8–10.5)
WBC # FLD AUTO: 10.32 K/UL — SIGNIFICANT CHANGE UP (ref 3.8–10.5)

## 2020-03-27 PROCEDURE — 71045 X-RAY EXAM CHEST 1 VIEW: CPT | Mod: 26

## 2020-03-27 PROCEDURE — 99233 SBSQ HOSP IP/OBS HIGH 50: CPT

## 2020-03-27 PROCEDURE — 99285 EMERGENCY DEPT VISIT HI MDM: CPT

## 2020-03-27 PROCEDURE — 93010 ELECTROCARDIOGRAM REPORT: CPT

## 2020-03-27 PROCEDURE — 70450 CT HEAD/BRAIN W/O DYE: CPT | Mod: 26

## 2020-03-27 RX ORDER — ENOXAPARIN SODIUM 100 MG/ML
40 INJECTION SUBCUTANEOUS DAILY
Refills: 0 | Status: DISCONTINUED | OUTPATIENT
Start: 2020-03-27 | End: 2020-03-29

## 2020-03-27 RX ORDER — ACETAMINOPHEN 500 MG
650 TABLET ORAL EVERY 6 HOURS
Refills: 0 | Status: DISCONTINUED | OUTPATIENT
Start: 2020-03-27 | End: 2020-03-29

## 2020-03-27 RX ORDER — HYDRALAZINE HCL 50 MG
25 TABLET ORAL EVERY 12 HOURS
Refills: 0 | Status: DISCONTINUED | OUTPATIENT
Start: 2020-03-27 | End: 2020-03-29

## 2020-03-27 RX ORDER — INSULIN LISPRO 100/ML
VIAL (ML) SUBCUTANEOUS AT BEDTIME
Refills: 0 | Status: DISCONTINUED | OUTPATIENT
Start: 2020-03-27 | End: 2020-03-29

## 2020-03-27 RX ORDER — GLUCAGON INJECTION, SOLUTION 0.5 MG/.1ML
1 INJECTION, SOLUTION SUBCUTANEOUS ONCE
Refills: 0 | Status: DISCONTINUED | OUTPATIENT
Start: 2020-03-27 | End: 2020-03-29

## 2020-03-27 RX ORDER — HYDROXYCHLOROQUINE SULFATE 200 MG
400 TABLET ORAL EVERY 12 HOURS
Refills: 0 | Status: DISCONTINUED | OUTPATIENT
Start: 2020-03-27 | End: 2020-03-27

## 2020-03-27 RX ORDER — DEXTROSE 50 % IN WATER 50 %
15 SYRINGE (ML) INTRAVENOUS ONCE
Refills: 0 | Status: DISCONTINUED | OUTPATIENT
Start: 2020-03-27 | End: 2020-03-29

## 2020-03-27 RX ORDER — HYDROXYCHLOROQUINE SULFATE 200 MG
TABLET ORAL
Refills: 0 | Status: DISCONTINUED | OUTPATIENT
Start: 2020-03-27 | End: 2020-03-27

## 2020-03-27 RX ORDER — DEXTROSE 50 % IN WATER 50 %
12.5 SYRINGE (ML) INTRAVENOUS ONCE
Refills: 0 | Status: DISCONTINUED | OUTPATIENT
Start: 2020-03-27 | End: 2020-03-29

## 2020-03-27 RX ORDER — INSULIN GLARGINE 100 [IU]/ML
10 INJECTION, SOLUTION SUBCUTANEOUS EVERY MORNING
Refills: 0 | Status: DISCONTINUED | OUTPATIENT
Start: 2020-03-27 | End: 2020-03-29

## 2020-03-27 RX ORDER — DEXTROSE 50 % IN WATER 50 %
25 SYRINGE (ML) INTRAVENOUS ONCE
Refills: 0 | Status: DISCONTINUED | OUTPATIENT
Start: 2020-03-27 | End: 2020-03-29

## 2020-03-27 RX ORDER — AZITHROMYCIN 500 MG/1
500 TABLET, FILM COATED ORAL EVERY 24 HOURS
Refills: 0 | Status: DISCONTINUED | OUTPATIENT
Start: 2020-03-27 | End: 2020-03-29

## 2020-03-27 RX ORDER — SODIUM CHLORIDE 9 MG/ML
2300 INJECTION INTRAMUSCULAR; INTRAVENOUS; SUBCUTANEOUS ONCE
Refills: 0 | Status: COMPLETED | OUTPATIENT
Start: 2020-03-27 | End: 2020-03-27

## 2020-03-27 RX ORDER — CEFEPIME 1 G/1
2000 INJECTION, POWDER, FOR SOLUTION INTRAMUSCULAR; INTRAVENOUS ONCE
Refills: 0 | Status: COMPLETED | OUTPATIENT
Start: 2020-03-27 | End: 2020-03-27

## 2020-03-27 RX ORDER — SODIUM CHLORIDE 9 MG/ML
1000 INJECTION, SOLUTION INTRAVENOUS
Refills: 0 | Status: DISCONTINUED | OUTPATIENT
Start: 2020-03-27 | End: 2020-03-29

## 2020-03-27 RX ORDER — CEFTRIAXONE 500 MG/1
1000 INJECTION, POWDER, FOR SOLUTION INTRAMUSCULAR; INTRAVENOUS EVERY 24 HOURS
Refills: 0 | Status: DISCONTINUED | OUTPATIENT
Start: 2020-03-27 | End: 2020-03-29

## 2020-03-27 RX ORDER — AZITHROMYCIN 500 MG/1
500 TABLET, FILM COATED ORAL ONCE
Refills: 0 | Status: COMPLETED | OUTPATIENT
Start: 2020-03-27 | End: 2020-03-27

## 2020-03-27 RX ORDER — SODIUM CHLORIDE 9 MG/ML
1000 INJECTION, SOLUTION INTRAVENOUS
Refills: 0 | Status: DISCONTINUED | OUTPATIENT
Start: 2020-03-27 | End: 2020-03-28

## 2020-03-27 RX ORDER — INSULIN LISPRO 100/ML
VIAL (ML) SUBCUTANEOUS
Refills: 0 | Status: DISCONTINUED | OUTPATIENT
Start: 2020-03-27 | End: 2020-03-29

## 2020-03-27 RX ORDER — HYDROXYCHLOROQUINE SULFATE 200 MG
400 TABLET ORAL ONCE
Refills: 0 | Status: COMPLETED | OUTPATIENT
Start: 2020-03-27 | End: 2020-03-27

## 2020-03-27 RX ORDER — ACETAMINOPHEN 500 MG
975 TABLET ORAL ONCE
Refills: 0 | Status: COMPLETED | OUTPATIENT
Start: 2020-03-27 | End: 2020-03-27

## 2020-03-27 RX ADMIN — AZITHROMYCIN 500 MILLIGRAM(S): 500 TABLET, FILM COATED ORAL at 19:09

## 2020-03-27 RX ADMIN — Medication 975 MILLIGRAM(S): at 17:49

## 2020-03-27 RX ADMIN — CEFEPIME 100 MILLIGRAM(S): 1 INJECTION, POWDER, FOR SOLUTION INTRAMUSCULAR; INTRAVENOUS at 19:10

## 2020-03-27 RX ADMIN — Medication 400 MILLIGRAM(S): at 19:10

## 2020-03-27 RX ADMIN — Medication 2 MILLIGRAM(S): at 18:17

## 2020-03-27 RX ADMIN — SODIUM CHLORIDE 2300 MILLILITER(S): 9 INJECTION INTRAMUSCULAR; INTRAVENOUS; SUBCUTANEOUS at 19:00

## 2020-03-27 RX ADMIN — SODIUM CHLORIDE 2300 MILLILITER(S): 9 INJECTION INTRAMUSCULAR; INTRAVENOUS; SUBCUTANEOUS at 20:00

## 2020-03-27 RX ADMIN — CEFEPIME 2000 MILLIGRAM(S): 1 INJECTION, POWDER, FOR SOLUTION INTRAMUSCULAR; INTRAVENOUS at 19:40

## 2020-03-27 NOTE — ED ADULT NURSE NOTE - CADM POA CENTRAL LINE
Radha Yi, PT  Physical Therapist                    
No

## 2020-03-27 NOTE — ED PROVIDER NOTE - OBJECTIVE STATEMENT
65 y/o m with h/o CVA, DM pw fever, cough, generalized weakness and AMS gradually worsening over 4 days. Patient very confused on exam, agitated, having trouble answering questions, refusing blood work. As per wife she has noticed him becoming more altered over the past few days and irrational.   PMD- Dr. Reyes  No smoking hx

## 2020-03-27 NOTE — ED PROVIDER NOTE - CLINICAL SUMMARY MEDICAL DECISION MAKING FREE TEXT BOX
65 y/o m with h/o CVA, DM pw fever, cough, generalized weakness and AMS gradually worsening over 4 days. Patient very confused on exam, agitated, having trouble answering questions, refusing blood work. As per wife she has noticed him becoming more altered over the past few days and irrational. Meeting sepsis criteria upon arrival- Will obtain sepsis work up, check CT head, COVID and RVP testing, administer antipyretics, fluids and likely ADMIT 63 y/o m with h/o CVA, DM pw fever, cough, generalized weakness and AMS gradually worsening over 4 days. Patient very confused on exam, agitated, having trouble answering questions, refusing blood work. As per wife she has noticed him becoming more altered over the past few days and irrational. Meeting sepsis criteria upon arrival- Will obtain sepsis work up, check CT head, COVID and RVP testing, administer antipyretics, fluids and likely ADMIT  Update: CXR- multifocal Pneumonia COVID in appearance, ABX given. O2 Sat 96 % on 3 L NC. Desats to 89% on RA. Admission accepted by Dr. Whatley

## 2020-03-27 NOTE — ED ADULT NURSE NOTE - NSIMPLEMENTINTERV_GEN_ALL_ED
Implemented All Universal Safety Interventions:  Ira to call system. Call bell, personal items and telephone within reach. Instruct patient to call for assistance. Room bathroom lighting operational. Non-slip footwear when patient is off stretcher. Physically safe environment: no spills, clutter or unnecessary equipment. Stretcher in lowest position, wheels locked, appropriate side rails in place.

## 2020-03-27 NOTE — H&P ADULT - NSHPLABSRESULTS_GEN_ALL_CORE
13.9   10.32 )-----------( 365      ( 27 Mar 2020 18:55 )             42.7       03-27    134<L>  |  99  |  34<H>  ----------------------------<  217<H>  4.5   |  19<L>  |  1.55<H>    Ca    9.2      27 Mar 2020 18:55    TPro  7.9  /  Alb  3.2<L>  /  TBili  0.6  /  DBili  x   /  AST  37  /  ALT  46<H>  /  AlkPhos  49  03-27        PT/INR - ( 27 Mar 2020 18:55 )   PT: 14.4 sec;   INR: 1.28 ratio         PTT - ( 27 Mar 2020 18:55 )  PTT:52.1 sec    Lactate Trend  03-27 @ 18:55 Lactate:1.0           Labs reviewed:     CXR personally reviewed  < from: Xray Chest 1 View-PORTABLE IMMEDIATE (03.27.20 @ 17:18) >      EXAM:  XR CHEST PORTABLE IMMED 1V      PROCEDURE DATE:  03/27/2020        INTERPRETATION:  AP chest.    Clinical indications: Sepsis.    IMPRESSION: There are bilateral peripheral patchy opacities that are more prominent on the left compatible with multifocal pneumonia. The heart is normal in size. The bones are intact.  ECG reviewed and interpreted:       < from: CT Head No Cont (03.27.20 @ 17:46) >    IMPRESSION:    No acute intracranial findings.      If acute stroke is of clinical concern, MRI with diffusion-weighted images would be helpful for further characterization.    < end of copied text >

## 2020-03-27 NOTE — H&P ADULT - ASSESSMENT
63 y/o m with h/o CVA, DM2 ON TRULICITY AND METFORMIN, HTN, c/o cogh adn AMS, admitted for sepsis 2/2 pna WARNER COVID    ADMIT TO [facility]  am labs  rvp, covid sent from ED.   npo  rocephine  zithromax  plaquenil     HTN- bp boderline, will hold labetalol and enalapril  will c/w hydralazine q12 for now    dm2- lantus, iss, accuchecks    JAKE likely 2/2 sepsis- creat 1.5, baseline unknown,  will hold enalapril for now     d/w wife at the bedside

## 2020-03-27 NOTE — H&P ADULT - HISTORY OF PRESENT ILLNESS
65 y/o m with h/o CVA, DM2 ON TRULICITY AND METFORMIN, HTN, c/o  cough for last 2-3 days, and  generalized weakness, wife at bedside also has cough, noticed patient to be agitated today not making any sense, confused, in ed was refusing blood ,having trouble answering questions, found to have temp of 102. no n/v, no dysuria.

## 2020-03-27 NOTE — ED PROVIDER NOTE - CARE PLAN
Principal Discharge DX:	Viral syndrome Principal Discharge DX:	Viral syndrome  Secondary Diagnosis:	Pneumonia of both lungs due to infectious organism, unspecified part of lung

## 2020-03-27 NOTE — ED PROVIDER NOTE - ATTENDING CONTRIBUTION TO CARE
65 y/o m with h/o CVA, DM pw fever, cough, generalized weakness and AMS gradually worsening over 4 days. Patient very confused on exam, agitated, having trouble answering questions, refusing blood work. As per wife she has noticed him becoming more altered over the past few days and irrational. Meeting sepsis criteria upon arrival- Will obtain sepsis work up, check CT head, COVID and RVP testing, administer antipyretics, fluids and likely ADMIT  Update: CXR- multifocal Pneumonia COVID in appearance, ABX given. O2 Sat 96 % on 3 L NC. Desats to 89% on RA. Admission accepted by Dr. Chacorta Blum:  I have reviewed and discussed with the PA/ resident the case specifics, including the history, physical assessment, evaluation, conclusion, laboratory results, and medical plan. I agree with the contents, and conclusions. I have personally examined, and interviewed the patient.

## 2020-03-27 NOTE — H&P ADULT - NSHPPHYSICALEXAM_GEN_ALL_CORE
Vital Signs Last 24 Hrs  T(C): 37.1 (27 Mar 2020 19:25), Max: 38.9 (27 Mar 2020 16:31)  T(F): 98.8 (27 Mar 2020 19:25), Max: 102.1 (27 Mar 2020 16:31)  HR: 88 (27 Mar 2020 19:25) (88 - 95)  BP: 115/51 (27 Mar 2020 19:25) (100/61 - 123/66)  BP(mean): --  RR: 18 (27 Mar 2020 19:25) (18 - 18)  SpO2: 96% (27 Mar 2020 19:25) (94% - 96%)    GENERAL- NAD  EAR/NOSE/MOUTH/THROAT - no pharyngeal exudates, no oral lesions,  MMM  EYES- MICHAEL, conjunctiva and Sclera clear  NECK- supple  RESPIRATORY-  bi basilar rales  CARDIOVASCULAR - SIS2, RRR  GI - soft NT BS present  EXTREMITIES- no pedal edema  NEUROLOGY- no gross focal deficits  SKIN- no rashes, warm to touch  PSYCHIATRY- AAO X 0  MUSCULOSKELETAL- ROM normal

## 2020-03-28 LAB
ALBUMIN SERPL ELPH-MCNC: 2.7 G/DL — LOW (ref 3.3–5)
ALP SERPL-CCNC: 46 U/L — SIGNIFICANT CHANGE UP (ref 40–120)
ALT FLD-CCNC: 40 U/L — SIGNIFICANT CHANGE UP (ref 10–45)
ANION GAP SERPL CALC-SCNC: 15 MMOL/L — SIGNIFICANT CHANGE UP (ref 5–17)
AST SERPL-CCNC: 33 U/L — SIGNIFICANT CHANGE UP (ref 10–40)
BASOPHILS # BLD AUTO: 0.02 K/UL — SIGNIFICANT CHANGE UP (ref 0–0.2)
BASOPHILS NFR BLD AUTO: 0.2 % — SIGNIFICANT CHANGE UP (ref 0–2)
BILIRUB SERPL-MCNC: 0.4 MG/DL — SIGNIFICANT CHANGE UP (ref 0.2–1.2)
BUN SERPL-MCNC: 23 MG/DL — SIGNIFICANT CHANGE UP (ref 7–23)
CALCIUM SERPL-MCNC: 8.6 MG/DL — SIGNIFICANT CHANGE UP (ref 8.4–10.5)
CHLORIDE SERPL-SCNC: 105 MMOL/L — SIGNIFICANT CHANGE UP (ref 96–108)
CO2 SERPL-SCNC: 19 MMOL/L — LOW (ref 22–31)
CREAT SERPL-MCNC: 0.99 MG/DL — SIGNIFICANT CHANGE UP (ref 0.5–1.3)
EOSINOPHIL # BLD AUTO: 0.08 K/UL — SIGNIFICANT CHANGE UP (ref 0–0.5)
EOSINOPHIL NFR BLD AUTO: 0.9 % — SIGNIFICANT CHANGE UP (ref 0–6)
GLUCOSE SERPL-MCNC: 154 MG/DL — HIGH (ref 70–99)
HBA1C BLD-MCNC: 9.1 % — HIGH (ref 4–5.6)
HCT VFR BLD CALC: 39.7 % — SIGNIFICANT CHANGE UP (ref 39–50)
HCV AB S/CO SERPL IA: 0.22 S/CO — SIGNIFICANT CHANGE UP (ref 0–0.99)
HCV AB SERPL-IMP: SIGNIFICANT CHANGE UP
HGB BLD-MCNC: 12.9 G/DL — LOW (ref 13–17)
IMM GRANULOCYTES NFR BLD AUTO: 0.6 % — SIGNIFICANT CHANGE UP (ref 0–1.5)
INR BLD: 1.31 RATIO — HIGH (ref 0.88–1.16)
LYMPHOCYTES # BLD AUTO: 1.74 K/UL — SIGNIFICANT CHANGE UP (ref 1–3.3)
LYMPHOCYTES # BLD AUTO: 19.6 % — SIGNIFICANT CHANGE UP (ref 13–44)
MCHC RBC-ENTMCNC: 28.3 PG — SIGNIFICANT CHANGE UP (ref 27–34)
MCHC RBC-ENTMCNC: 32.5 GM/DL — SIGNIFICANT CHANGE UP (ref 32–36)
MCV RBC AUTO: 87.1 FL — SIGNIFICANT CHANGE UP (ref 80–100)
MONOCYTES # BLD AUTO: 0.78 K/UL — SIGNIFICANT CHANGE UP (ref 0–0.9)
MONOCYTES NFR BLD AUTO: 8.8 % — SIGNIFICANT CHANGE UP (ref 2–14)
NEUTROPHILS # BLD AUTO: 6.23 K/UL — SIGNIFICANT CHANGE UP (ref 1.8–7.4)
NEUTROPHILS NFR BLD AUTO: 69.9 % — SIGNIFICANT CHANGE UP (ref 43–77)
NRBC # BLD: 0 /100 WBCS — SIGNIFICANT CHANGE UP (ref 0–0)
PLATELET # BLD AUTO: 343 K/UL — SIGNIFICANT CHANGE UP (ref 150–400)
POTASSIUM SERPL-MCNC: 4.3 MMOL/L — SIGNIFICANT CHANGE UP (ref 3.5–5.3)
POTASSIUM SERPL-SCNC: 4.3 MMOL/L — SIGNIFICANT CHANGE UP (ref 3.5–5.3)
PROT SERPL-MCNC: 6.8 G/DL — SIGNIFICANT CHANGE UP (ref 6–8.3)
PROTHROM AB SERPL-ACNC: 14.8 SEC — HIGH (ref 10–12.9)
RBC # BLD: 4.56 M/UL — SIGNIFICANT CHANGE UP (ref 4.2–5.8)
RBC # FLD: 14 % — SIGNIFICANT CHANGE UP (ref 10.3–14.5)
SARS-COV-2 RNA SPEC QL NAA+PROBE: DETECTED
SODIUM SERPL-SCNC: 139 MMOL/L — SIGNIFICANT CHANGE UP (ref 135–145)
WBC # BLD: 8.9 K/UL — SIGNIFICANT CHANGE UP (ref 3.8–10.5)
WBC # FLD AUTO: 8.9 K/UL — SIGNIFICANT CHANGE UP (ref 3.8–10.5)

## 2020-03-28 PROCEDURE — 99223 1ST HOSP IP/OBS HIGH 75: CPT

## 2020-03-28 RX ADMIN — Medication 3: at 17:56

## 2020-03-28 RX ADMIN — Medication 2: at 12:38

## 2020-03-28 RX ADMIN — AZITHROMYCIN 255 MILLIGRAM(S): 500 TABLET, FILM COATED ORAL at 06:12

## 2020-03-28 RX ADMIN — ENOXAPARIN SODIUM 40 MILLIGRAM(S): 100 INJECTION SUBCUTANEOUS at 12:38

## 2020-03-28 RX ADMIN — SODIUM CHLORIDE 60 MILLILITER(S): 9 INJECTION, SOLUTION INTRAVENOUS at 06:11

## 2020-03-28 RX ADMIN — Medication 25 MILLIGRAM(S): at 06:13

## 2020-03-28 RX ADMIN — CEFTRIAXONE 100 MILLIGRAM(S): 500 INJECTION, POWDER, FOR SOLUTION INTRAMUSCULAR; INTRAVENOUS at 06:11

## 2020-03-28 RX ADMIN — Medication 1: at 10:16

## 2020-03-28 RX ADMIN — INSULIN GLARGINE 10 UNIT(S): 100 INJECTION, SOLUTION SUBCUTANEOUS at 10:16

## 2020-03-28 RX ADMIN — Medication 25 MILLIGRAM(S): at 18:37

## 2020-03-28 NOTE — PROGRESS NOTE ADULT - SUBJECTIVE AND OBJECTIVE BOX
Patient is a 64y old  Male who presents with a chief complaint of AMS (27 Mar 2020 20:58)    Feels okay.  Denies chest pain. mild sob.      Patient seen and examined at bedside.    ALLERGIES:  No Known Allergies    MEDICATIONS  (STANDING):  azithromycin  IVPB 500 milliGRAM(s) IV Intermittent every 24 hours  cefTRIAXone   IVPB 1000 milliGRAM(s) IV Intermittent every 24 hours  dextrose 5% + sodium chloride 0.45%. 1000 milliLiter(s) (60 mL/Hr) IV Continuous <Continuous>  dextrose 5%. 1000 milliLiter(s) (50 mL/Hr) IV Continuous <Continuous>  dextrose 50% Injectable 12.5 Gram(s) IV Push once  dextrose 50% Injectable 25 Gram(s) IV Push once  dextrose 50% Injectable 25 Gram(s) IV Push once  enoxaparin Injectable 40 milliGRAM(s) SubCutaneous daily  hydrALAZINE 25 milliGRAM(s) Oral every 12 hours  insulin glargine Injectable (LANTUS) 10 Unit(s) SubCutaneous every morning  insulin lispro (HumaLOG) corrective regimen sliding scale   SubCutaneous three times a day before meals  insulin lispro (HumaLOG) corrective regimen sliding scale   SubCutaneous at bedtime    MEDICATIONS  (PRN):  acetaminophen   Tablet .. 650 milliGRAM(s) Oral every 6 hours PRN Mild Pain (1 - 3)  dextrose 40% Gel 15 Gram(s) Oral once PRN Blood Glucose LESS THAN 70 milliGRAM(s)/deciliter  glucagon  Injectable 1 milliGRAM(s) IntraMuscular once PRN Glucose LESS THAN 70 milligrams/deciliter    Vital Signs Last 24 Hrs  T(F): 99.5 (28 Mar 2020 06:29), Max: 102.1 (27 Mar 2020 16:31)  HR: 90 (28 Mar 2020 06:29) (80 - 95)  BP: 142/66 (28 Mar 2020 06:29) (100/61 - 142/66)  RR: 14 (28 Mar 2020 06:29) (12 - 18)  SpO2: 95% (28 Mar 2020 06:29) (94% - 96%)  I&O's Summary    BMI (kg/m2): 30.4 (03-27-20 @ 16:31)  PHYSICAL EXAM:  General: NAD, A/O x 3  ENT: MMM  Neck: Supple, No JVD  Lungs: Clear to auscultation bilaterally  Cardio: RRR, S1/S2, No murmurs  Abdomen: Soft, Nontender, Nondistended; Bowel sounds present  Extremities: No calf tenderness, No pitting edema    LABS:             12.9   8.90  )-----------( 343      ( 28 Mar 2020 08:05 )             39.7       03-28  139  |  105  |  23  ----------------------------<  154  4.3   |  19  |  0.99    Ca    8.6      28 Mar 2020 08:05    TPro  6.8  /  Alb  2.7  /  TBili  0.4  /  DBili  x   /  AST  33  /  ALT  40  /  AlkPhos  46  03-28     eGFR if Non African American: 80 mL/min/1.73M2 (03-28-20 @ 08:05)  eGFR if : 93 mL/min/1.73M2 (03-28-20 @ 08:05)    PT/INR - ( 28 Mar 2020 08:05 )   PT: 14.8 sec;   INR: 1.31 ratio       PTT - ( 27 Mar 2020 18:55 )  PTT:52.1 sec   Lactate, Blood: 1.0 mmol/L (03-27 @ 18:55)    POCT Blood Glucose.: 121 mg/dL (27 Mar 2020 22:37)    RADIOLOGY & ADDITIONAL TESTS:    Care Discussed with Consultants/Other Providers:

## 2020-03-28 NOTE — PROGRESS NOTE ADULT - ASSESSMENT
65 y/o m with h/o CVA, DM2 ON TRULICITY AND METFORMIN, HTN, c/o cogh adn AMS, admitted for sepsis 2/2 pna WARNER LEEID    COVID PENDING - 3/27/20  - afebrile  - not hypoxic  - no leukopenia, no lymphocytopenia, no thrombocytopenia  - monitor CBC with Diff    Suspected multifocal pneumonia  - cont rocephin/azithromycin    Acute Kidney injury  Hyponatremia  - resolved after IV fluids    Hypertension  - hold enalapril given tawanda    HTN  - bp borderline, will hold labetalol and enalapril  will c/w hydralazine q12 for now    dm2 - lantus, iss, accuchecks    DVT PPx  - cont lovenox

## 2020-03-29 ENCOUNTER — TRANSCRIPTION ENCOUNTER (OUTPATIENT)
Age: 65
End: 2020-03-29

## 2020-03-29 VITALS
OXYGEN SATURATION: 96 % | DIASTOLIC BLOOD PRESSURE: 76 MMHG | RESPIRATION RATE: 16 BRPM | HEART RATE: 101 BPM | SYSTOLIC BLOOD PRESSURE: 149 MMHG | TEMPERATURE: 98 F

## 2020-03-29 PROCEDURE — 96365 THER/PROPH/DIAG IV INF INIT: CPT

## 2020-03-29 PROCEDURE — 85027 COMPLETE CBC AUTOMATED: CPT

## 2020-03-29 PROCEDURE — 93005 ELECTROCARDIOGRAM TRACING: CPT

## 2020-03-29 PROCEDURE — 86803 HEPATITIS C AB TEST: CPT

## 2020-03-29 PROCEDURE — 87635 SARS-COV-2 COVID-19 AMP PRB: CPT

## 2020-03-29 PROCEDURE — 87633 RESP VIRUS 12-25 TARGETS: CPT

## 2020-03-29 PROCEDURE — 83605 ASSAY OF LACTIC ACID: CPT

## 2020-03-29 PROCEDURE — 87798 DETECT AGENT NOS DNA AMP: CPT

## 2020-03-29 PROCEDURE — 80053 COMPREHEN METABOLIC PANEL: CPT

## 2020-03-29 PROCEDURE — 70450 CT HEAD/BRAIN W/O DYE: CPT

## 2020-03-29 PROCEDURE — 87040 BLOOD CULTURE FOR BACTERIA: CPT

## 2020-03-29 PROCEDURE — 82962 GLUCOSE BLOOD TEST: CPT

## 2020-03-29 PROCEDURE — 96372 THER/PROPH/DIAG INJ SC/IM: CPT

## 2020-03-29 PROCEDURE — 99285 EMERGENCY DEPT VISIT HI MDM: CPT | Mod: 25

## 2020-03-29 PROCEDURE — 82955 ASSAY OF G6PD ENZYME: CPT

## 2020-03-29 PROCEDURE — 85730 THROMBOPLASTIN TIME PARTIAL: CPT

## 2020-03-29 PROCEDURE — 99239 HOSP IP/OBS DSCHRG MGMT >30: CPT

## 2020-03-29 PROCEDURE — 85610 PROTHROMBIN TIME: CPT

## 2020-03-29 PROCEDURE — 83036 HEMOGLOBIN GLYCOSYLATED A1C: CPT

## 2020-03-29 PROCEDURE — 87581 M.PNEUMON DNA AMP PROBE: CPT

## 2020-03-29 PROCEDURE — 71045 X-RAY EXAM CHEST 1 VIEW: CPT

## 2020-03-29 PROCEDURE — 36415 COLL VENOUS BLD VENIPUNCTURE: CPT

## 2020-03-29 PROCEDURE — 87486 CHLMYD PNEUM DNA AMP PROBE: CPT

## 2020-03-29 RX ORDER — AZITHROMYCIN 500 MG/1
1 TABLET, FILM COATED ORAL
Qty: 2 | Refills: 0
Start: 2020-03-29 | End: 2020-03-30

## 2020-03-29 RX ORDER — AZITHROMYCIN 500 MG/1
500 TABLET, FILM COATED ORAL ONCE
Refills: 0 | Status: COMPLETED | OUTPATIENT
Start: 2020-03-29 | End: 2020-03-29

## 2020-03-29 RX ADMIN — ENOXAPARIN SODIUM 40 MILLIGRAM(S): 100 INJECTION SUBCUTANEOUS at 12:41

## 2020-03-29 RX ADMIN — AZITHROMYCIN 500 MILLIGRAM(S): 500 TABLET, FILM COATED ORAL at 12:42

## 2020-03-29 RX ADMIN — Medication 25 MILLIGRAM(S): at 05:44

## 2020-03-29 NOTE — DISCHARGE NOTE NURSING/CASE MANAGEMENT/SOCIAL WORK - NSDCPNINST_GEN_ALL_CORE
If any complaints of weakness, increasing shortness of breath, change in mental status call primary MD or return to emergency room

## 2020-03-29 NOTE — PROGRESS NOTE ADULT - SUBJECTIVE AND OBJECTIVE BOX
Patient is a 64y old  Male who presents with a chief complaint of AMS (28 Mar 2020 10:44)    Feels good. wants to go home.      Patient seen and examined at bedside.    ALLERGIES:  No Known Allergies    MEDICATIONS  (STANDING):  azithromycin  IVPB 500 milliGRAM(s) IV Intermittent every 24 hours  cefTRIAXone   IVPB 1000 milliGRAM(s) IV Intermittent every 24 hours  dextrose 5%. 1000 milliLiter(s) (50 mL/Hr) IV Continuous <Continuous>  dextrose 50% Injectable 12.5 Gram(s) IV Push once  dextrose 50% Injectable 25 Gram(s) IV Push once  dextrose 50% Injectable 25 Gram(s) IV Push once  enoxaparin Injectable 40 milliGRAM(s) SubCutaneous daily  hydrALAZINE 25 milliGRAM(s) Oral every 12 hours  insulin glargine Injectable (LANTUS) 10 Unit(s) SubCutaneous every morning  insulin lispro (HumaLOG) corrective regimen sliding scale   SubCutaneous three times a day before meals  insulin lispro (HumaLOG) corrective regimen sliding scale   SubCutaneous at bedtime    MEDICATIONS  (PRN):  acetaminophen   Tablet .. 650 milliGRAM(s) Oral every 6 hours PRN Mild Pain (1 - 3)  dextrose 40% Gel 15 Gram(s) Oral once PRN Blood Glucose LESS THAN 70 milliGRAM(s)/deciliter  glucagon  Injectable 1 milliGRAM(s) IntraMuscular once PRN Glucose LESS THAN 70 milligrams/deciliter    Vital Signs Last 24 Hrs  T(F): 98.4 (28 Mar 2020 18:38), Max: 98.4 (28 Mar 2020 18:38)  HR: 111 (28 Mar 2020 18:38) (102 - 111)  BP: 139/83 (28 Mar 2020 18:38) (139/83 - 141/83)  RR: 16 (28 Mar 2020 18:38) (16 - 16)  SpO2: 92% (28 Mar 2020 18:38) (92% - 94%)  I&O's Summary    BMI (kg/m2): 30.4 (03-27-20 @ 16:31)  PHYSICAL EXAM:  General: NAD, A/O x 3  ENT: MMM  Neck: Supple, No JVD  Lungs: basilar crackles  Cardio: RRR, S1/S2, No murmurs  Abdomen: Soft, Nontender, Nondistended; Bowel sounds present  Extremities: No calf tenderness, No pitting edema    LABS:                        12.9   8.90  )-----------( 343      ( 28 Mar 2020 08:05 )             39.7       03-28    139  |  105  |  23  ----------------------------<  154  4.3   |  19  |  0.99    Ca    8.6      28 Mar 2020 08:05    TPro  6.8  /  Alb  2.7  /  TBili  0.4  /  DBili  x   /  AST  33  /  ALT  40  /  AlkPhos  46  03-28     eGFR if Non African American: 80 mL/min/1.73M2 (03-28-20 @ 08:05)  eGFR if : 93 mL/min/1.73M2 (03-28-20 @ 08:05)    PT/INR - ( 28 Mar 2020 08:05 )   PT: 14.8 sec;   INR: 1.31 ratio         PTT - ( 27 Mar 2020 18:55 )  PTT:52.1 sec   Lactate, Blood: 1.0 mmol/L (03-27 @ 18:55)    03-28 [id number] 9.1    Culture - Blood (collected 27 Mar 2020 18:55)  Source: .Blood Blood-Peripheral  Preliminary Report (29 Mar 2020 01:02):    No growth to date.    Culture - Blood (collected 27 Mar 2020 18:55)  Source: .Blood Blood-Peripheral  Preliminary Report (29 Mar 2020 01:02):    No growth to date.    RADIOLOGY & ADDITIONAL TESTS:    Care Discussed with Consultants/Other Providers:

## 2020-03-29 NOTE — DISCHARGE NOTE PROVIDER - NSDCFUSCHEDAPPT_GEN_ALL_CORE_FT
LARS ROBERTSON ; 04/30/2020 ; N[facility] [id number] [facility] LARS ROBERTSON ; 04/30/2020 ; [id number] [place]

## 2020-03-29 NOTE — PROGRESS NOTE ADULT - ASSESSMENT
63 y/o m with h/o CVA, DM2 ON TRULICITY AND METFORMIN, HTN, c/o cogh adn AMS, admitted for sepsis 2/2 pna [facility] COVID    COVID POSITIVE - 3/27/20  - afebrile  - not hypoxic  - no leukopenia, no lymphocytopenia, no thrombocytopenia  - d/c home with home quarantine until 4/10/20    Suspected multifocal pneumonia  - 2 more days of azithromycin to complete pneumonia/covid     Acute Kidney injury  Hyponatremia  - resolved after IV fluids    Hypertension  - hold enalapril given tawanda    HTN  - bp borderline, will hold labetalol and enalapril  will c/w hydralazine q12 for now    dm2 - lantus, iss, accuchecks    DVT PPx  - cont lovenox    Dispo  d/c home

## 2020-03-29 NOTE — DISCHARGE NOTE NURSING/CASE MANAGEMENT/SOCIAL WORK - PATIENT PORTAL LINK FT
You can access the FollowMyHealth Patient Portal offered by [facility] by registering at the following website: [email or web address]. By joining [email or web address]’s FollowMyHealth portal, you will also be able to view your health information using other applications (apps) compatible with our system.

## 2020-03-29 NOTE — DISCHARGE NOTE PROVIDER - NSDCCPCAREPLAN_GEN_ALL_CORE_FT
PRINCIPAL DISCHARGE DIAGNOSIS  Diagnosis: Infection due to [place] coronavirus  Assessment and Plan of Treatment:       SECONDARY DISCHARGE DIAGNOSES  Diagnosis: Pneumonia of both lungs due to infectious organism, unspecified part of lung  Assessment and Plan of Treatment:

## 2020-03-29 NOTE — DISCHARGE NOTE PROVIDER - NSDCMRMEDTOKEN_GEN_ALL_CORE_FT
azithromycin 500 mg oral tablet: 1 tab(s) orally once a day   docusate sodium 100 mg oral capsule: 1 cap(s) orally 3 times a day, As Needed  hydrALAZINE 25 mg oral tablet: 1 tab(s) orally 3 times a day  insulin lispro 100 units/mL subcutaneous solution:  subcutaneous 3 times a day (before meals); 1 Unit(s) if Glucose 151 - 200  2 Unit(s) if Glucose 201 - 250  3 Unit(s) if Glucose 251 - 300  4 Unit(s) if Glucose 301 - 350  5 Unit(s) if Glucose 351 - 400  6 Unit(s) if Glucose Greater Than 400  insulin lispro 100 units/mL subcutaneous solution:  subcutaneous once a day (at bedtime); 2 Unit(s) if Glucose 251 - 300  4 Unit(s) if Glucose 301 - 350  6 Unit(s) if Glucose 351 - 400  8 Unit(s) if Glucose Greater Than 400  insulin lispro 100 units/mL subcutaneous solution: 9 unit(s) subcutaneous 3 times a day (before meals)  labetalol 200 mg oral tablet: 2 tab(s) orally every 8 hours  Lantus 100 units/mL subcutaneous solution: 22 unit(s) subcutaneous once a day (at bedtime)  ocular lubricant ophthalmic solution: 1 drop(s) to each affected eye 3 times a day  petrolatum topical ointment: 1 application topically once a day  senna oral tablet: 2 tab(s) orally once a day (at bedtime), As Needed  sulfacetamide sodium 10% ophthalmic ointment: 1 application to each affected eye 2 times a day

## 2020-03-29 NOTE — DISCHARGE NOTE PROVIDER - HOSPITAL COURSE
History of Present Illness:     63 y/o m with h/o CVA, DM2 ON TRULICITY AND METFORMIN, HTN, c/o  cough for last 2-3 days, and  generalized weakness, wife at bedside also has cough, noticed patient to be agitated today not making any sense, confused, in ed was refusing blood ,having trouble answering questions, found to have temp of 102. no n/v, no dysuria.         Patient found to have COVID POSITIVE.  Patient without hypoxia, but with metabolic encephalopathy secondary to hyponatremia, acute kidney injury and COVID.        Patient to remain on home quarantine until 4/10/2020.        Patient will need 2 more days of azithromycin  mg.        Discharging Provider:  Cholo South MD    Contact Info: Cell [phone] - Please call with any questions or concerns.        Outpatient Provider: Dr. Reyes, notified

## 2020-03-29 NOTE — DISCHARGE NOTE PROVIDER - CARE PROVIDER_API CALL
Reyes, John A (MD)  Internal Medicine  [address] [facility], [address]  [place] [address]  Phone: [phone]  Fax: [phone]  Follow Up Time:

## 2020-03-31 LAB — G6PD RBC-CCNC: 14.2 U/G HGB — SIGNIFICANT CHANGE UP (ref 7–20.5)

## 2020-04-01 LAB
CULTURE RESULTS: SIGNIFICANT CHANGE UP
CULTURE RESULTS: SIGNIFICANT CHANGE UP
SPECIMEN SOURCE: SIGNIFICANT CHANGE UP
SPECIMEN SOURCE: SIGNIFICANT CHANGE UP

## 2020-04-11 ENCOUNTER — RX RENEWAL (OUTPATIENT)
Age: 65
End: 2020-04-11

## 2020-04-14 LAB — GLUCOSE BLDC GLUCOMTR-MCNC: 155 MG/DL — HIGH (ref 70–99)

## 2020-05-01 ENCOUNTER — RX RENEWAL (OUTPATIENT)
Age: 65
End: 2020-05-01

## 2020-05-28 ENCOUNTER — APPOINTMENT (OUTPATIENT)
Dept: ENDOCRINOLOGY | Facility: CLINIC | Age: 65
End: 2020-05-28
Payer: COMMERCIAL

## 2020-05-28 VITALS
DIASTOLIC BLOOD PRESSURE: 82 MMHG | HEART RATE: 82 BPM | BODY MASS INDEX: 30.49 KG/M2 | OXYGEN SATURATION: 98 % | SYSTOLIC BLOOD PRESSURE: 140 MMHG | HEIGHT: 70 IN | WEIGHT: 213 LBS | TEMPERATURE: 98.5 F

## 2020-05-28 LAB
GLUCOSE BLDC GLUCOMTR-MCNC: 125
HBA1C MFR BLD HPLC: 7.3

## 2020-05-28 PROCEDURE — 99214 OFFICE O/P EST MOD 30 MIN: CPT | Mod: 25

## 2020-05-28 PROCEDURE — 82962 GLUCOSE BLOOD TEST: CPT

## 2020-05-28 PROCEDURE — 83036 HEMOGLOBIN GLYCOSYLATED A1C: CPT | Mod: QW

## 2020-05-28 RX ORDER — DULAGLUTIDE 1.5 MG/.5ML
1.5 INJECTION, SOLUTION SUBCUTANEOUS
Refills: 0 | Status: DISCONTINUED | COMMUNITY
End: 2020-05-28

## 2020-05-28 RX ORDER — AZITHROMYCIN 250 MG/1
250 TABLET, FILM COATED ORAL
Qty: 1 | Refills: 1 | Status: DISCONTINUED | COMMUNITY
Start: 2020-03-21 | End: 2020-05-28

## 2020-05-28 RX ORDER — DAPAGLIFLOZIN 10 MG/1
10 TABLET, FILM COATED ORAL
Refills: 0 | Status: DISCONTINUED | COMMUNITY
End: 2020-05-28

## 2020-05-28 NOTE — PHYSICAL EXAM
[Alert] : alert [No Acute Distress] : no acute distress [No Respiratory Distress] : no respiratory distress [Clear to Auscultation] : lungs were clear to auscultation bilaterally [Normal to Percussion] : lungs were normal to percussion [Normal S1, S2] : normal S1 and S2 [Regular Rhythm] : with a regular rhythm [Normal Rate] : heart rate was normal [Normal Bowel Sounds] : normal bowel sounds [Not Tender] : non-tender [Soft] : abdomen soft [Right Foot Was Examined] : right foot ~C was examined [Left Foot Was Examined] : left foot ~C was examined [Normal] : normal [2+] : 2+ in the dorsalis pedis [Diminished Throughout Both Feet] : normal tactile sensation with monofilament testing throughout both feet

## 2020-05-28 NOTE — REVIEW OF SYSTEMS
[Chest Pain] : no chest pain [Palpitations] : no palpitations [Shortness Of Breath] : no shortness of breath [Cough] : no cough

## 2020-06-09 ENCOUNTER — RX RENEWAL (OUTPATIENT)
Age: 65
End: 2020-06-09

## 2020-06-11 ENCOUNTER — RX RENEWAL (OUTPATIENT)
Age: 65
End: 2020-06-11

## 2020-06-17 ENCOUNTER — RX RENEWAL (OUTPATIENT)
Age: 65
End: 2020-06-17

## 2020-06-22 RX ORDER — LANCETS 33 GAUGE
EACH MISCELLANEOUS
Qty: 1 | Refills: 3 | Status: ACTIVE | COMMUNITY
Start: 2018-11-08 | End: 1900-01-01

## 2020-09-17 ENCOUNTER — APPOINTMENT (OUTPATIENT)
Dept: ENDOCRINOLOGY | Facility: CLINIC | Age: 65
End: 2020-09-17
Payer: MEDICARE

## 2020-09-17 VITALS
OXYGEN SATURATION: 97 % | HEIGHT: 70 IN | BODY MASS INDEX: 31.5 KG/M2 | TEMPERATURE: 98.2 F | HEART RATE: 79 BPM | SYSTOLIC BLOOD PRESSURE: 126 MMHG | WEIGHT: 220 LBS | DIASTOLIC BLOOD PRESSURE: 78 MMHG

## 2020-09-17 LAB
GLUCOSE BLDC GLUCOMTR-MCNC: 130
HBA1C MFR BLD HPLC: 9.5

## 2020-09-17 PROCEDURE — 82962 GLUCOSE BLOOD TEST: CPT

## 2020-09-17 PROCEDURE — 99214 OFFICE O/P EST MOD 30 MIN: CPT | Mod: 25

## 2020-09-17 PROCEDURE — 83036 HEMOGLOBIN GLYCOSYLATED A1C: CPT | Mod: QW

## 2020-09-17 RX ORDER — HYDROCODONE BITARTRATE AND HOMATROPINE METHYLBROMIDE 5; 1.5 MG/5ML; MG/5ML
5-1.5 SYRUP ORAL
Qty: 120 | Refills: 0 | Status: DISCONTINUED | COMMUNITY
Start: 2020-03-21 | End: 2020-09-17

## 2020-09-17 RX ORDER — DAPAGLIFLOZIN 10 MG/1
10 TABLET, FILM COATED ORAL
Qty: 90 | Refills: 3 | Status: DISCONTINUED | COMMUNITY
Start: 2018-11-08 | End: 2020-09-17

## 2020-09-17 NOTE — PHYSICAL EXAM
[Alert] : alert [No Acute Distress] : no acute distress [No Respiratory Distress] : no respiratory distress [Normal Rate and Effort] : normal respiratory rate and effort [Clear to Auscultation] : lungs were clear to auscultation bilaterally [Normal S1, S2] : normal S1 and S2 [Normal Rate] : heart rate was normal [Regular Rhythm] : with a regular rhythm [Normal Bowel Sounds] : normal bowel sounds [Not Tender] : non-tender [Not Distended] : not distended [Soft] : abdomen soft

## 2020-09-18 LAB
ALBUMIN SERPL ELPH-MCNC: 5.1 G/DL
ALP BLD-CCNC: 50 U/L
ALT SERPL-CCNC: 25 U/L
ANION GAP SERPL CALC-SCNC: 18 MMOL/L
AST SERPL-CCNC: 16 U/L
BILIRUB SERPL-MCNC: 0.5 MG/DL
BUN SERPL-MCNC: 17 MG/DL
CALCIUM SERPL-MCNC: 10 MG/DL
CHLORIDE SERPL-SCNC: 104 MMOL/L
CHOLEST SERPL-MCNC: 114 MG/DL
CHOLEST/HDLC SERPL: 3.2 RATIO
CO2 SERPL-SCNC: 18 MMOL/L
CREAT SERPL-MCNC: 0.86 MG/DL
CREAT SPEC-SCNC: 64 MG/DL
GLUCOSE SERPL-MCNC: 115 MG/DL
HDLC SERPL-MCNC: 35 MG/DL
LDLC SERPL CALC-MCNC: 55 MG/DL
MICROALBUMIN 24H UR DL<=1MG/L-MCNC: <1.2 MG/DL
MICROALBUMIN/CREAT 24H UR-RTO: NORMAL MG/G
POTASSIUM SERPL-SCNC: 4.5 MMOL/L
PROT SERPL-MCNC: 7 G/DL
SODIUM SERPL-SCNC: 140 MMOL/L
TRIGL SERPL-MCNC: 119 MG/DL
TSH SERPL-ACNC: 1.52 UIU/ML

## 2020-09-18 NOTE — ASSESSMENT
[FreeTextEntry1] : 64 yo male with hx of t2dm uncontrolled (HbA1C 9.4%) with hx of CVA here for f/u.\par 1) T2DM: Continue trulicity 1.5mg po sq qweekly and metformin to 850mg po bid.\par Stop farxiga and switch to jardiance 25mcg po qdaily.\par Pt prefers to make diet/activity changes as opposed to adding another agent. \par Test daily.\par Last saw optho 2/2020 - no retinopathy. Recieved report. \par 2) HTN: BP at goal, less than 130/80.\par Continue Norvasc/hydralazine/labetalol/lisinopril. \par 3) Dyslipidemia: continue statin therapy.\par Yearly labs today.\par Return in 3-4 months.

## 2020-09-18 NOTE — HISTORY OF PRESENT ILLNESS
[FreeTextEntry1] : 66 yo male with t2dm controlled diagnosed 5 years ago (Hb A1C is 9.5%) with cva here for hospital f/u. He is currently takes farxiga 10mg po qdaily, trulicity 1.5mg sq qweekly and metformin 850mg po bid. His insurance now covers jardiance so he asks to switch. \par He notes that he is not exercising and he has not been eating well. He has gained 7 pounds since May. He knows that he needs to get out more. He has been consumed in helping care for his wife who needs a lot of assistance due to his CIDP.\par Last saw optho 2/2020. report received. He denies blurred vision, diplopia. He did not bring his meter. Denies hypoglycemia.

## 2020-11-16 NOTE — ED ADULT TRIAGE NOTE - PAIN RATING/NUMBER SCALE (0-10): REST
[facility]. The patient has an appointment this afternoon in [place] with the retina specialist. ED MD updated.    0

## 2021-04-14 ENCOUNTER — RX RENEWAL (OUTPATIENT)
Age: 66
End: 2021-04-14

## 2021-05-06 ENCOUNTER — RX RENEWAL (OUTPATIENT)
Age: 66
End: 2021-05-06

## 2021-05-20 ENCOUNTER — APPOINTMENT (OUTPATIENT)
Dept: ENDOCRINOLOGY | Facility: CLINIC | Age: 66
End: 2021-05-20
Payer: MEDICARE

## 2021-05-20 VITALS
SYSTOLIC BLOOD PRESSURE: 148 MMHG | HEART RATE: 80 BPM | OXYGEN SATURATION: 98 % | BODY MASS INDEX: 31.92 KG/M2 | DIASTOLIC BLOOD PRESSURE: 94 MMHG | WEIGHT: 223 LBS | HEIGHT: 70 IN | TEMPERATURE: 98 F

## 2021-05-20 VITALS — DIASTOLIC BLOOD PRESSURE: 82 MMHG | SYSTOLIC BLOOD PRESSURE: 140 MMHG

## 2021-05-20 LAB
GLUCOSE BLDC GLUCOMTR-MCNC: 225
HBA1C MFR BLD HPLC: 9.5

## 2021-05-20 PROCEDURE — 83036 HEMOGLOBIN GLYCOSYLATED A1C: CPT | Mod: QW

## 2021-05-20 PROCEDURE — 82962 GLUCOSE BLOOD TEST: CPT

## 2021-05-20 RX ORDER — CHROMIUM 200 MCG
1000 TABLET ORAL
Qty: 1 | Refills: 3 | Status: DISCONTINUED | COMMUNITY
Start: 2018-02-22 | End: 2021-05-20

## 2021-05-20 NOTE — ASSESSMENT
[FreeTextEntry1] : 64 yo male with hx of t2dm uncontrolled (HbA1C 9.5%) with hx of CVA here for f/u.\par 1) T2DM: Continue trulicity 1.5mg po sq qweekly, metformin to 850mg po bid, and jardiance 10mg po qdaily. \par He does not want to take insulin so will add glimepiride 2mg po qdaily. Pt will also cut back on his candy/sweets intake.\par Test daily.\par Will get optho report from Dr. Mendosa - per pt no retinopathy.\par 2) HTN: BP slightly above goal at 140/82 as last time. Will not change meds as he is due for his mid-day hydralzaine dose. \par Continue Norvasc/hydralazine/labetalol/lisinopril. \par 3) Dyslipidemia: continue statin therapy.\par Yearly labs at next visit\par Return in 3-4 months.

## 2021-05-20 NOTE — HISTORY OF PRESENT ILLNESS
[FreeTextEntry1] : 64 yo male with t2dm controlled diagnosed 5 years ago (Hb A1C 9.5%) with cva here for hospital f/u. He is currently takes jardiance 25mg po qdaily, trulicity 1.5mg sq qweekly and metformin 850mg po bid. He admits to rarely testing. \par \par He notes that he has been eating increased candy and sweets. He also eats dinner late - dinner at 8pm. B'fast is oatmeal with fruit about 9am. Lunch is salad or sandwich. Dinner: meat, starch, and vegetable. Good appetite/po intake. \par He recently saw optho but they did not send the report. He got a script for glasses. He denies blurred vision, diplopia. \par His son is graduating from law school later this year. His other son is a . \par \par Dr. John Reyes, PCP\par Dr. Don Mendosa, optho

## 2021-07-07 ENCOUNTER — RX RENEWAL (OUTPATIENT)
Age: 66
End: 2021-07-07

## 2021-09-30 ENCOUNTER — APPOINTMENT (OUTPATIENT)
Dept: ENDOCRINOLOGY | Facility: CLINIC | Age: 66
End: 2021-09-30
Payer: MEDICARE

## 2021-09-30 VITALS
HEIGHT: 70 IN | WEIGHT: 215 LBS | BODY MASS INDEX: 30.78 KG/M2 | OXYGEN SATURATION: 96 % | HEART RATE: 80 BPM | SYSTOLIC BLOOD PRESSURE: 130 MMHG | TEMPERATURE: 97.9 F | DIASTOLIC BLOOD PRESSURE: 80 MMHG

## 2021-09-30 LAB — HBA1C MFR BLD HPLC: 8.5

## 2021-09-30 PROCEDURE — 83036 HEMOGLOBIN GLYCOSYLATED A1C: CPT | Mod: QW

## 2021-09-30 PROCEDURE — 99214 OFFICE O/P EST MOD 30 MIN: CPT | Mod: 25

## 2021-09-30 RX ORDER — DICLOFENAC SODIUM 10 MG/G
1 GEL TOPICAL
Qty: 1500 | Refills: 3 | Status: DISCONTINUED | COMMUNITY
Start: 2017-10-05 | End: 2021-09-30

## 2021-09-30 NOTE — HISTORY OF PRESENT ILLNESS
[FreeTextEntry1] : 67 yo male with t2dm uncontrolled diagnosed 6 years ago (Hb A1C 8.5%) with cva here for hospital f/u. He is currently takes Jardiance 25mg po qdaily, trulicity 1.5mg sq qweekly and metformin 850mg po bid. He admits to rarely testing. \par \par He saw optho in July - told no retinopathy. No blurred vision, diplopia. \par He has lost 8 pounds since he last visit. He notes that he has nausea with his am pills and loose BM. This occurs intermittently for a few days. \par He declines a flu shot, as well as a COVID vaccine due to "natural immunity" as he had COVID in 2020.  \par \par Dr. John Reyes, PCP\par Dr. Don Mendosa, opt

## 2021-09-30 NOTE — PHYSICAL EXAM
[Alert] : alert [Well Nourished] : well nourished [No Acute Distress] : no acute distress [No Respiratory Distress] : no respiratory distress [Normal Rate and Effort] : normal respiratory rate and effort [Clear to Auscultation] : lungs were clear to auscultation bilaterally [Normal S1, S2] : normal S1 and S2 [Normal Rate] : heart rate was normal [Regular Rhythm] : with a regular rhythm [Normal Bowel Sounds] : normal bowel sounds [Not Tender] : non-tender [Not Distended] : not distended [Soft] : abdomen soft [Right Foot Was Examined] : right foot ~C was examined [Left Foot Was Examined] : left foot ~C was examined [Normal] : normal [2+] : 2+ in the posterior tibialis [Diminished Throughout Both Feet] : normal tactile sensation with monofilament testing throughout both feet

## 2021-09-30 NOTE — ASSESSMENT
[FreeTextEntry1] : 65 yo male with hx of t2dm uncontrolled (HbA1C 8.5%) with hx of CVA here for f/u.\par 1) T2DM: Increase trulicity to 3 mg po sq qweekly.\par Continue metformin to 850mg po bid, and jardiance 10mg po qdaily. Will try to change his metformin to 1000mg ER as he notes nausea with his morning pills.\par Test daily.\par Will get optho report from Dr. Mendosa - bill pt no retinopathy.\par 2) HTN: BP at goal at 130/80. \par Continue Norvasc/hydralazine/labetalol/lisinopril. \par 3) Dyslipidemia: continue statin therapy.\par 4) Health maintenance: Check DXA at next visit as he is over 65.\par Yearly labs due.\par Return in 3-4 months. Will x-tyler his care to Dr. Kaitlyn Chery as I am leaving in December.

## 2021-11-10 ENCOUNTER — RX RENEWAL (OUTPATIENT)
Age: 66
End: 2021-11-10

## 2022-02-09 ENCOUNTER — APPOINTMENT (OUTPATIENT)
Dept: ENDOCRINOLOGY | Facility: CLINIC | Age: 67
End: 2022-02-09

## 2022-02-15 ENCOUNTER — APPOINTMENT (OUTPATIENT)
Dept: ENDOCRINOLOGY | Facility: CLINIC | Age: 67
End: 2022-02-15

## 2022-04-12 ENCOUNTER — APPOINTMENT (OUTPATIENT)
Dept: ENDOCRINOLOGY | Facility: CLINIC | Age: 67
End: 2022-04-12

## 2022-05-13 ENCOUNTER — RX RENEWAL (OUTPATIENT)
Age: 67
End: 2022-05-13

## 2022-05-17 ENCOUNTER — RX RENEWAL (OUTPATIENT)
Age: 67
End: 2022-05-17

## 2022-05-19 ENCOUNTER — RX RENEWAL (OUTPATIENT)
Age: 67
End: 2022-05-19

## 2022-06-24 ENCOUNTER — NON-APPOINTMENT (OUTPATIENT)
Age: 67
End: 2022-06-24

## 2022-06-27 ENCOUNTER — APPOINTMENT (OUTPATIENT)
Dept: ENDOCRINOLOGY | Facility: CLINIC | Age: 67
End: 2022-06-27

## 2022-06-27 VITALS
DIASTOLIC BLOOD PRESSURE: 80 MMHG | BODY MASS INDEX: 32.21 KG/M2 | HEART RATE: 76 BPM | SYSTOLIC BLOOD PRESSURE: 130 MMHG | OXYGEN SATURATION: 99 % | WEIGHT: 225 LBS | HEIGHT: 70 IN | TEMPERATURE: 97.3 F

## 2022-06-27 DIAGNOSIS — R73.9 HYPERGLYCEMIA, UNSPECIFIED: ICD-10-CM

## 2022-06-27 DIAGNOSIS — E55.9 VITAMIN D DEFICIENCY, UNSPECIFIED: ICD-10-CM

## 2022-06-27 LAB
GLUCOSE BLDC GLUCOMTR-MCNC: 340
HBA1C MFR BLD HPLC: 12.6

## 2022-06-27 PROCEDURE — 99214 OFFICE O/P EST MOD 30 MIN: CPT | Mod: 25

## 2022-06-27 PROCEDURE — 83036 HEMOGLOBIN GLYCOSYLATED A1C: CPT | Mod: QW

## 2022-06-27 PROCEDURE — 82962 GLUCOSE BLOOD TEST: CPT

## 2022-06-27 RX ORDER — CHOLECALCIFEROL (VITAMIN D3) 10(400)/ML
DROPS ORAL
Qty: 120 | Refills: 4 | Status: ACTIVE | COMMUNITY
Start: 2022-06-27 | End: 1900-01-01

## 2022-06-27 RX ORDER — LANCING DEVICE
EACH MISCELLANEOUS
Qty: 1 | Refills: 1 | Status: ACTIVE | COMMUNITY
Start: 2022-06-27 | End: 1900-01-01

## 2022-06-27 RX ORDER — LANCING DEVICE
EACH MISCELLANEOUS
Qty: 120 | Refills: 4 | Status: ACTIVE | COMMUNITY
Start: 2022-06-27 | End: 1900-01-01

## 2022-07-17 LAB
25(OH)D3 SERPL-MCNC: 36.4 NG/ML
ALBUMIN SERPL ELPH-MCNC: 5.2 G/DL
ALP BLD-CCNC: 69 U/L
ALT SERPL-CCNC: 30 U/L
ANION GAP SERPL CALC-SCNC: 12 MMOL/L
AST SERPL-CCNC: 22 U/L
BILIRUB SERPL-MCNC: 0.5 MG/DL
BUN SERPL-MCNC: 14 MG/DL
CALCIUM SERPL-MCNC: 10.3 MG/DL
CHLORIDE SERPL-SCNC: 100 MMOL/L
CHOLEST SERPL-MCNC: 164 MG/DL
CO2 SERPL-SCNC: 24 MMOL/L
CREAT SERPL-MCNC: 0.86 MG/DL
CREAT SPEC-SCNC: 112 MG/DL
EGFR: 96 ML/MIN/1.73M2
GLUCOSE SERPL-MCNC: 383 MG/DL
HDLC SERPL-MCNC: 34 MG/DL
LDLC SERPL CALC-MCNC: 86 MG/DL
MICROALBUMIN 24H UR DL<=1MG/L-MCNC: 9.5 MG/DL
MICROALBUMIN/CREAT 24H UR-RTO: 85 MG/G
NONHDLC SERPL-MCNC: 130 MG/DL
POTASSIUM SERPL-SCNC: 4.7 MMOL/L
PROT SERPL-MCNC: 7.4 G/DL
SODIUM SERPL-SCNC: 137 MMOL/L
TRIGL SERPL-MCNC: 220 MG/DL

## 2022-07-18 ENCOUNTER — NON-APPOINTMENT (OUTPATIENT)
Age: 67
End: 2022-07-18

## 2022-09-09 RX ORDER — LANCING DEVICE
EACH MISCELLANEOUS
Qty: 1 | Refills: 1 | Status: ACTIVE | COMMUNITY
Start: 2022-09-09 | End: 1900-01-01

## 2022-09-09 RX ORDER — LANCING DEVICE
EACH MISCELLANEOUS
Qty: 120 | Refills: 4 | Status: ACTIVE | COMMUNITY
Start: 2022-09-09 | End: 1900-01-01

## 2022-10-06 ENCOUNTER — EMERGENCY (EMERGENCY)
Facility: HOSPITAL | Age: 67
LOS: 1 days | Discharge: HOME | End: 2022-10-06
Attending: EMERGENCY MEDICINE | Admitting: EMERGENCY MEDICINE
Payer: MEDICARE

## 2022-10-06 VITALS
RESPIRATION RATE: 16 BRPM | WEIGHT: 214.07 LBS | SYSTOLIC BLOOD PRESSURE: 151 MMHG | OXYGEN SATURATION: 96 % | DIASTOLIC BLOOD PRESSURE: 81 MMHG | TEMPERATURE: 98 F | HEIGHT: 70 IN | HEART RATE: 84 BPM

## 2022-10-06 DIAGNOSIS — Z85.828 PERSONAL HISTORY OF OTHER MALIGNANT NEOPLASM OF SKIN: Chronic | ICD-10-CM

## 2022-10-06 PROCEDURE — 73630 X-RAY EXAM OF FOOT: CPT | Mod: 26,LT

## 2022-10-06 PROCEDURE — 73630 X-RAY EXAM OF FOOT: CPT

## 2022-10-06 PROCEDURE — 99284 EMERGENCY DEPT VISIT MOD MDM: CPT | Mod: FS,57

## 2022-10-06 PROCEDURE — 28490 TREAT BIG TOE FRACTURE: CPT | Mod: 54

## 2022-10-06 PROCEDURE — 99283 EMERGENCY DEPT VISIT LOW MDM: CPT

## 2022-10-06 RX ORDER — IBUPROFEN 200 MG
400 TABLET ORAL ONCE
Refills: 0 | Status: COMPLETED | OUTPATIENT
Start: 2022-10-06 | End: 2022-10-06

## 2022-10-06 RX ADMIN — Medication 400 MILLIGRAM(S): at 19:21

## 2022-10-06 RX ADMIN — Medication 400 MILLIGRAM(S): at 19:51

## 2022-10-06 NOTE — ED PROVIDER NOTE - PATIENT PORTAL LINK FT
You can access the FollowMyHealth Patient Portal offered by [facility] by registering at the following website: [email or web address]. By joining [facility]’s FollowMyHealth portal, you will also be able to view your health information using other applications (apps) compatible with our system.

## 2022-10-06 NOTE — ED PROVIDER NOTE - NS ED ATTENDING STATEMENT MOD
This was a shared visit with the MARISELA. I reviewed and verified the documentation and independently performed the documented:

## 2022-10-06 NOTE — ED PROVIDER NOTE - CLINICAL SUMMARY MEDICAL DECISION MAKING FREE TEXT BOX
67 yr old male with hx of DM, HTN presents with left big toe pain s/p injury. Pt reports this morning when he got out of bed, he tripped over his shoe, and fell, bending his left big toe backwards. Pt denies hitting head or LOC. Denies any other injuries. Denies taking any pain meds. Pt ambulating today.  left toe : + ecchymosis to 1st left toe, + tenderness, pain on ROM, positive 2+ pedal pulses, less than 2 sec cap refill 67 yr old male with hx of DM, HTN presents with left big toe pain s/p injury. Pt reports this morning when he got out of bed, he tripped over his shoe, and fell, bending his left big toe backwards. Pt denies hitting head or LOC. Denies any other injuries. Denies taking any pain meds. Pt ambulating today.  left toe : + ecchymosis to 1st left toe, + tenderness, pain on ROM, positive 2+ pedal pulses, less than 2 sec cap refill   xray showing fx 1st toe   basilio tape applied, pt stable for dc and will fu with podiatry

## 2022-10-06 NOTE — ED PROVIDER NOTE - PHYSICAL EXAMINATION
left toe : + ecchymosis to 1st left toe, + tenderness, pain on ROM, positive 2+ pedal pulses, less than 2 sec cap refill

## 2022-10-06 NOTE — ED ADULT NURSE NOTE - OBJECTIVE STATEMENT
Pt came from home with c/o left great toe pain s/p injury. Pt states that this morning when he got OOB, he tripped over his shoe and his left great toe bent backwards. Pt denies any head injury or LOC from the fall. Pt is able to bear weight on his LLE, but  states that his left great toe pain is worsened with ambulation. Pt with hx DM and HTN. Pt denies taking any pain meds PTA. Pt noted to have bruising on his left great toe.

## 2022-10-06 NOTE — ED PROVIDER NOTE - ATTENDING APP SHARED VISIT CONTRIBUTION OF CARE
This was shared visit with MARISELA. I have reviewed and verified the documentation and independently performed the documented history, exam and mdm  67 yr old male with hx of DM, HTN presents with left big toe pain s/p injury. Pt reports this morning when he got out of bed, he tripped over his shoe, and fell, bending his left big toe backwards. Pt denies hitting head or LOC. Denies any other injuries. Denies taking any pain meds. Pt ambulating today.

## 2022-10-06 NOTE — ED ADULT NURSE NOTE - PRO INTERPRETER NEED 2
Patient advised that corneal condition may limit visual recovery following cataract surgery. English

## 2022-10-06 NOTE — ED PROVIDER NOTE - NSICDXPASTMEDICALHX_GEN_ALL_CORE_FT
PAST MEDICAL HISTORY:  CVA (cerebral vascular accident)     DM (diabetes mellitus)     Hypertension

## 2022-10-06 NOTE — ED PROVIDER NOTE - INCIDENT LOCATION
Subjective:     Chief Complaint   Patient presents with    Ear Fullness    Medication Evaluation       Doug aCtes is a 28 y.o. male who complains of sore throat, post nasal drip and left ear fullness for 5-6 days. He denies a history of shortness of breath and wheezing. Denies fever, chest pain, dizziness. Patient does not smoke cigarettes. He has history of ear infections in the past and also has seasonal allergies. He has not taken his allergy medication and the pollen count is somewhat high. No evaluation to date. No recent travel. Also here for follow up on hypogonadism, has been treating with testosterone injections once a month since 12/7/16. Says that he feels that his energy level and concentration has improved a lot but his ED persists, maybe improved \"slightly\". Wonders if he needs to go up on testosterone dose. He denies any urinary symptoms, denies testicular pain or symptoms. ED is described as unable to maintain an erection, denies premature ejaculation or inability to ejaculate. Last injection was about 3 weeks ago. ROS is otherwise negative. Chart reviewed: immunizations are up to date and documented. Past Medical History:   Diagnosis Date    Sleep apnea, obstructive 11/4/2016    Uses CPAP since 5/2016    Vitamin D deficiency      Social History   Substance Use Topics    Smoking status: Never Smoker    Smokeless tobacco: Not on file    Alcohol use No     Current Outpatient Prescriptions on File Prior to Visit   Medication Sig Dispense Refill    testosterone cypionate (DEPOTESTOTERONE CYPIONATE) 200 mg/mL injection 1 mL by IntraMUSCular route every thirty (30) days. Max Daily Amount: 200 mg.  Indications: Male Hypogonadism 3 Vial 3    Syringe, Disposable, 3 mL syrg Use one syringe with testosterone injection every 30 days 3 Syringe 3    Needle, Disp, 21 G 21 x 1 1/2 \" ndle Use 1 needle with 1 syringe for testosterone injection every 30 days 3 Each 3    cholecalciferol (VITAMIN D3) 1,000 unit cap Take  by mouth daily. No current facility-administered medications on file prior to visit. Allergies   Allergen Reactions    Pcn [Penicillins] Hives        Review of Systems  Pertinent items are noted in HPI. Agree with nurses note. OBJECTIVE:   Visit Vitals    /85    Pulse 68    Temp 98 °F (36.7 °C)    Wt (!) 357 lb (161.9 kg)    SpO2 96%    BMI 41.26 kg/m2     He appears well, vital signs are as noted above   PAIN: No complaints of pain today. HEAD:  Normocephalic. Atraumatic. Non tender sinuses x 4. EYE: PERRLA. EOMs intact. Sclera anicteric without injection. No drainage or discharge. EARS: Hearing intact bilaterally. External ear canals normal without evidence of blood or swelling. Bilateral TM's display scar tissue. Left TM is bulging with surrounding erythema and fluid. Right TM has no erythema. NOSE: Patent. Nasal turbinates boggy. No polyps noted. No erythema. Clear discharge. MOUTH: mucous membranes pink and moist. Posterior pharynx normal with cobblestone appearance. No erythema, white exudate or obstruction. NECK: supple. Midline trachea. RESP: Breath sounds are symmetrical bilaterally. Unlabored without SOB. Speaking in full sentences. Clear to auscultation bilaterally anteriorly and posteriorly. No wheezes. No rales or rhonchi. Non-productive cough when elicited. CV: normal rate. Regular rhythm. S1, S2 audible. No murmur noted. No rubs, clicks or gallops noted. HEME/LYMPH: peripheral pulses palpable 2+ x 4 extremities. No peripheral edema is noted. No cervical adenopathy noted. SKIN: clean dry and intact throughout. no rashes, erythema, ecchymosis, lacerations, abrasions, suspicious moles noted        Assessment/Plan:   Differential diagnosis and treatment options reviewed with patient who is in agreement with treatment plan as outlined below. ICD-10-CM ICD-9-CM    1.  Hypogonadism male E29.1 484.8 METABOLIC PANEL, COMPREHENSIVE      TESTOSTERONE, FREE & TOTAL      tadalafil (CIALIS) 10 mg tablet   2. Left otitis media, unspecified chronicity, unspecified otitis media type H66.92 382.9 azithromycin (ZITHROMAX) 250 mg tablet   3. Ear pressure, left H93.8X2 388.8    4. Erectile dysfunction, unspecified erectile dysfunction type N52.9 607.84 tadalafil (CIALIS) 10 mg tablet       ?ear infection progressing or improving. Will print prescription for antibiotic and if symptoms do not improve he will have filled. Symptomatic therapy suggested: Alternate Ibuprofen with Tylenol every 4 hours as needed for pain and discomfort. OTC nasal saline spray  2-3 sprays per nostril twice a day to clear eustachian tube and assist with post nasal drip symptoms. OTC antihistamines (Zyrtec or Claritin)  to reduce allergic symptoms such as sneezing, runny nose and itchy eyes. OTC Mucinex for congestion. Encouraged nutrition, hydration and rest; -avoid dairy, sugar and strenuous activity    Repeat labs for testosterone. PRN cialis prescribed and medication profile discussed with patient. Will call with results and decide if we need to increase the frequency of testosterone shots. No change at this time- continue once per month testosterone. Discussed BMI and healthy weight. Encouraged patient to work to implement changes including diet high in raw fruits and vegetables, lean protein and good fats. Limit refined, processed carbohydrates and sugar. Encouraged regular exercise. Verbal and written instructions (see AVS) provided. Patient expresses understanding and agreement of diagnosis and treatment plan. F/u if symptoms do not improve or worsen, follow up routine 3 months. home

## 2022-10-06 NOTE — ED PROVIDER NOTE - NSFOLLOWUPINSTRUCTIONS_ED_ALL_ED_FT
Rest, ice, elevate   Take motrin 400mg every 6 hours as needed for pain   Keep buddy tape in place   Follow up with podiatry this week           Toe Fracture    WHAT YOU NEED TO KNOW:    A toe fracture is a break in a bone in your toe.   Foot Anatomy         DISCHARGE INSTRUCTIONS:    Return to the emergency department if:   •Blood soaks through your bandage.      •You have severe pain in your toe.      •Your toe is cold or numb.      Call your doctor if:   •You have a fever.      •Your pain does not go away, even after treatment.      •Your toe continues to hurt even after it has healed.      •You have questions or concerns about your condition or care.      Medicines: You may need any of the following:   •NSAIDs, such as ibuprofen, help decrease swelling, pain, and fever. This medicine is available with or without a doctor's order. NSAIDs can cause stomach bleeding or kidney problems in certain people. If you take blood thinner medicine, always ask your healthcare provider if NSAIDs are safe for you. Always read the medicine label and follow directions.      •Prescription pain medicine may be given. Ask your healthcare provider how to take this medicine safely. Some prescription pain medicines contain acetaminophen. Do not take other medicines that contain acetaminophen without talking to your healthcare provider. Too much acetaminophen may cause liver damage. Prescription pain medicine may cause constipation. Ask your healthcare provider how to prevent or treat constipation.       •Antibiotics treat a bacterial infection. You may need antibiotics if you have an open wound.      •Take your medicine as directed. Contact your healthcare provider if you think your medicine is not helping or if you have side effects. Tell your provider if you are allergic to any medicine. Keep a list of the medicines, vitamins, and herbs you take. Include the amounts, and when and why you take them. Bring the list or the pill bottles to follow-up visits. Carry your medicine list with you in case of an emergency.      Self-care:   •Rest your toe so that it can heal. Return to normal activities as directed.      •Apply ice on your toe for 15 to 20 minutes every hour or as directed. Use an ice pack, or put crushed ice in a plastic bag. Cover it with a towel before you put it on your toe. Ice helps prevent tissue damage and decreases swelling and pain.      •Elevate your toe above the level of your heart as often as you can. This will help decrease swelling and pain. Prop your toe on pillows or blankets to keep it elevated comfortably.  Elevate Leg           •Use buddy tape, an elastic bandage, or a splint as directed. These help keep your toe in its correct position as it heals. Buddy tape means your fractured toe and the toe next to it are taped together.      •Use a support device such as a cane, crutches, walking boot, or hard soled shoe as directed. These help protect your toe and limit movement so it can heal.  Walking Boot           Follow up with your doctor as directed: You may need to return in 2 to 4 weeks. Write down your questions so you remember to ask them during your visits.       © Copyright [employer] 2022           back to top                          © Copyright [employer] 2022

## 2022-10-06 NOTE — ED PROVIDER NOTE - OBJECTIVE STATEMENT
67 yr old male with hx of DM, HTN presents with left big toe pain s/p injury. Pt reports this morning when he got out of bed, he tripped over his shoe, and fell, bending his left big toe backwards. Pt denies hitting head or LOC. Denies any other injuries. Denies taking any pain meds. Pt ambulating today.

## 2022-10-11 NOTE — CHART NOTE - NSCHARTNOTEFT_GEN_A_CORE
SW called patient to discuss and assist with follow up care.  SW unable to reach patient at this time.

## 2022-12-13 NOTE — ED ADULT NURSE NOTE - NSFALLRSKHRMRISKTYPE_ED_ALL_ED
Action 3: Continue
Detail Level: Zone
Continue Regimen: NBUVB TIW\\nVtamato to AA on body BID \\nClobetasol foam to scalp qd x1 week, then PRN for flares
other

## 2022-12-30 ENCOUNTER — APPOINTMENT (OUTPATIENT)
Dept: ENDOCRINOLOGY | Facility: CLINIC | Age: 67
End: 2022-12-30

## 2023-01-15 ENCOUNTER — RX RENEWAL (OUTPATIENT)
Age: 68
End: 2023-01-15

## 2023-05-02 ENCOUNTER — APPOINTMENT (OUTPATIENT)
Dept: ENDOCRINOLOGY | Facility: CLINIC | Age: 68
End: 2023-05-02

## 2023-05-29 ENCOUNTER — RX RENEWAL (OUTPATIENT)
Age: 68
End: 2023-05-29

## 2023-05-30 ENCOUNTER — RX RENEWAL (OUTPATIENT)
Age: 68
End: 2023-05-30

## 2023-06-21 ENCOUNTER — RX RENEWAL (OUTPATIENT)
Age: 68
End: 2023-06-21

## 2023-07-21 ENCOUNTER — APPOINTMENT (OUTPATIENT)
Dept: ENDOCRINOLOGY | Facility: CLINIC | Age: 68
End: 2023-07-21
Payer: MEDICARE

## 2023-07-21 VITALS
OXYGEN SATURATION: 99 % | SYSTOLIC BLOOD PRESSURE: 128 MMHG | DIASTOLIC BLOOD PRESSURE: 70 MMHG | HEART RATE: 68 BPM | HEIGHT: 70 IN | BODY MASS INDEX: 30.64 KG/M2 | WEIGHT: 214 LBS

## 2023-07-21 DIAGNOSIS — E11.40 TYPE 2 DIABETES MELLITUS WITH DIABETIC NEUROPATHY, UNSPECIFIED: ICD-10-CM

## 2023-07-21 DIAGNOSIS — E78.2 MIXED HYPERLIPIDEMIA: ICD-10-CM

## 2023-07-21 DIAGNOSIS — I10 ESSENTIAL (PRIMARY) HYPERTENSION: ICD-10-CM

## 2023-07-21 PROCEDURE — 83036 HEMOGLOBIN GLYCOSYLATED A1C: CPT | Mod: QW

## 2023-07-21 PROCEDURE — 99214 OFFICE O/P EST MOD 30 MIN: CPT

## 2023-07-21 RX ORDER — MULTIVIT-MIN/FOLIC/VIT K/LYCOP 400-300MCG
25 MCG TABLET ORAL
Qty: 90 | Refills: 3 | Status: ACTIVE | COMMUNITY
Start: 2019-02-20 | End: 1900-01-01

## 2023-07-21 RX ORDER — EMPAGLIFLOZIN 25 MG/1
25 TABLET, FILM COATED ORAL
Qty: 90 | Refills: 3 | Status: ACTIVE | COMMUNITY
Start: 2020-09-17 | End: 1900-01-01

## 2023-07-26 ENCOUNTER — NON-APPOINTMENT (OUTPATIENT)
Age: 68
End: 2023-07-26

## 2023-07-26 LAB
25(OH)D3 SERPL-MCNC: 29.4 NG/ML
ALBUMIN SERPL ELPH-MCNC: 5 G/DL
ALP BLD-CCNC: 58 U/L
ALT SERPL-CCNC: 25 U/L
ANION GAP SERPL CALC-SCNC: 16 MMOL/L
AST SERPL-CCNC: 18 U/L
BILIRUB SERPL-MCNC: 0.4 MG/DL
BUN SERPL-MCNC: 19 MG/DL
CALCIUM SERPL-MCNC: 9.9 MG/DL
CHLORIDE SERPL-SCNC: 102 MMOL/L
CHOLEST SERPL-MCNC: 132 MG/DL
CO2 SERPL-SCNC: 24 MMOL/L
CREAT SERPL-MCNC: 1 MG/DL
CREAT SPEC-SCNC: 300 MG/DL
EGFR: 82 ML/MIN/1.73M2
GLUCOSE SERPL-MCNC: 109 MG/DL
HBA1C MFR BLD HPLC: 9.1
HDLC SERPL-MCNC: 36 MG/DL
LDLC SERPL CALC-MCNC: 72 MG/DL
MICROALBUMIN 24H UR DL<=1MG/L-MCNC: 4.3 MG/DL
MICROALBUMIN/CREAT 24H UR-RTO: 14 MG/G
NONHDLC SERPL-MCNC: 96 MG/DL
POTASSIUM SERPL-SCNC: 4.5 MMOL/L
PROT SERPL-MCNC: 7.2 G/DL
SODIUM SERPL-SCNC: 142 MMOL/L
TRIGL SERPL-MCNC: 138 MG/DL

## 2023-09-01 ENCOUNTER — RX RENEWAL (OUTPATIENT)
Age: 68
End: 2023-09-01

## 2023-09-01 RX ORDER — GLIMEPIRIDE 2 MG/1
2 TABLET ORAL
Qty: 90 | Refills: 3 | Status: ACTIVE | COMMUNITY
Start: 2021-05-20 | End: 1900-01-01

## 2023-11-01 ENCOUNTER — APPOINTMENT (OUTPATIENT)
Dept: ENDOCRINOLOGY | Facility: CLINIC | Age: 68
End: 2023-11-01

## 2023-11-06 ENCOUNTER — APPOINTMENT (OUTPATIENT)
Dept: ENDOCRINOLOGY | Facility: CLINIC | Age: 68
End: 2023-11-06

## 2024-01-23 ENCOUNTER — APPOINTMENT (OUTPATIENT)
Dept: ENDOCRINOLOGY | Facility: CLINIC | Age: 69
End: 2024-01-23

## 2024-02-12 ENCOUNTER — RX RENEWAL (OUTPATIENT)
Age: 69
End: 2024-02-12

## 2024-02-20 NOTE — ED PROVIDER NOTE - CONDITION AT DISCHARGE:
Diagnosis:   Delayed speech [F80.9]       Referring Provider: Navin  Date of Evaluation:   10/03/2023    Precautions:  Pediatric Patient Next MD visit:   none scheduled  Date of Surgery: n/a   Insurance Primary/Secondary: BLUE CROSS MEDICAID / N/A       # Auth Visits: 12   Total Timed Treatment: 45 min  Date POC Expires: 4/1/2024  Total Treatment time: 45 min       Charges: [id number]       Treatment Number: 5/12       Total Visits: 17    Subjective: Patient arrived with her mother who remained present during today's session. Mom reported Sandy is saying \"hello\"!    Pain: 0/10     Objective/Goals:   LTG1: Patient will expand her expressive language through various modalities of communication (gestures, verbalizations, etc.) to effectively communicate her wants and needs with familiar and unfamiliar listeners.      STG1: Patient will produce verbal approximations or single words to request/label/refuse items in play 20x/session across 3 consecutive sessions.   Goal in Progress - Patient produced verbal approximations approximately 20+x today. She continues to point and make vocalizations to request. She was able to sign \"open\" and \"more\" at beginning of session when given max verbal/visual cues.  She stated \"pop\" 15+x during bubble play today and requested \"more\" using sign for continuation of bubbles.   Imitated: eye, dog, cat, hop, pop, in  Independent: mama, bye, hello     STG2: Patient will produce simple animal/environmental sounds 20x/session across 3 consecutive sessions.   GOAL MET- Patient continues to make verbal approximations for animal sounds (baa, moo). She made environmental sounds approx 10x today with animals.     NEW GOAL - Patient will produce exclamatory words/phrases 20x/session across 3 consecutive sessions.   Progress - Patient produced exclamatory phrases 15x today (yay, uh oh, wow, woah).     STG3: Patient will verbalize greetings/protests/affirmations (e.g. - hi, bye, no, yes) with a total  communication approach in 80% of opportunities across 3 consecutive sessions.   Goal in Progress - Today patient did not say hi to clinician for the greeting. However, she did say \"hello\" 2x across play. She said \"bye\" 5+x at end of session. She said \"yeah\" 3+x today and often shakes her head for \"no\".       HEP/Education: Target /g/ in \"go\" and \"grr\" across play; target focusing on 1-2 activities this week and using verbal language to request     Assessment: Sandy is a 2 year old female who presents to therapy with medical diagnosis of delayed speech and rehabilitation diagnosis of expressive language delay. Today we targeted total communication to request/comment/protest, production of environmental sounds, and greeting/farewell using farm animals, puzzles, color sorting activity, and bubbles. Sandy demonstrated difficulty requesting using verbal language and sign today. She required max cues to sign \"open\" and \"more\". She continues to make vocalizations and verbal approximations across play. She produced most language during bubble play today, as it appeared very motivating for her! Skilled speech therapy continues to be medically necessary in order to address deficits and reach functional goals.        Plan: Target goals listed on POC.    Improved

## 2024-04-15 ENCOUNTER — APPOINTMENT (OUTPATIENT)
Dept: ORTHOPEDIC SURGERY | Facility: CLINIC | Age: 69
End: 2024-04-15
Payer: MEDICARE

## 2024-04-15 VITALS — HEIGHT: 70 IN | BODY MASS INDEX: 29.35 KG/M2 | WEIGHT: 205 LBS

## 2024-04-15 DIAGNOSIS — M25.511 PAIN IN RIGHT SHOULDER: ICD-10-CM

## 2024-04-15 PROCEDURE — 99204 OFFICE O/P NEW MOD 45 MIN: CPT

## 2024-04-15 PROCEDURE — 73030 X-RAY EXAM OF SHOULDER: CPT | Mod: RT

## 2024-04-15 RX ORDER — ASPIRIN 81 MG
81 TABLET, DELAYED RELEASE (ENTERIC COATED) ORAL
Refills: 0 | Status: ACTIVE | COMMUNITY

## 2024-04-24 NOTE — PHYSICAL EXAM
[facility]   Physical Therapy Treatment Note     Name: Pernell Cuadra [facility] Number: [id number]    Khalif Herrera, PT          [Alert] : alert [No Acute Distress] : no acute distress [No Respiratory Distress] : no respiratory distress [Normal Rate and Effort] : normal respiratory rhythm and effort [Clear to Auscultation] : lungs were clear to auscultation bilaterally [Normal Rate] : heart rate was normal  [Normal S1, S2] : normal S1 and S2 [Regular Rhythm] : with a regular rhythm [Normal Bowel Sounds] : normal bowel sounds [Not Tender] : non-tender [Soft] : abdomen soft [Not Distended] : not distended [Right Foot Was Examined] : right foot ~C was examined [Left Foot Was Examined] : left foot ~C was examined [Normal] : normal [2+] : 2+ in the dorsalis pedis [Diminished Throughout Both Feet] : normal tactile sensation with monofilament testing throughout both feet

## 2024-05-16 ENCOUNTER — APPOINTMENT (OUTPATIENT)
Dept: ORTHOPEDIC SURGERY | Facility: CLINIC | Age: 69
End: 2024-05-16
Payer: MEDICARE

## 2024-05-16 VITALS — BODY MASS INDEX: 28.63 KG/M2 | WEIGHT: 200 LBS | HEIGHT: 70 IN

## 2024-05-16 DIAGNOSIS — S42.034A NONDISPLACED FRACTURE OF LATERAL END OF RIGHT CLAVICLE, INITIAL ENCOUNTER FOR CLOSED FRACTURE: ICD-10-CM

## 2024-05-16 PROCEDURE — 99214 OFFICE O/P EST MOD 30 MIN: CPT

## 2024-05-16 PROCEDURE — 73000 X-RAY EXAM OF COLLAR BONE: CPT | Mod: RT

## 2024-06-04 ENCOUNTER — RX RENEWAL (OUTPATIENT)
Age: 69
End: 2024-06-04

## 2024-06-27 ENCOUNTER — APPOINTMENT (OUTPATIENT)
Dept: ORTHOPEDIC SURGERY | Facility: CLINIC | Age: 69
End: 2024-06-27

## 2024-07-24 ENCOUNTER — NON-APPOINTMENT (OUTPATIENT)
Age: 69
End: 2024-07-24

## 2024-07-24 DIAGNOSIS — D22.9 MELANOCYTIC NEVI, UNSPECIFIED: ICD-10-CM

## 2024-07-24 DIAGNOSIS — L57.8 OTHER SKIN CHANGES DUE TO CHRONIC EXPOSURE TO NONIONIZING RADIATION: ICD-10-CM

## 2024-07-24 DIAGNOSIS — C44.319 BASAL CELL CARCINOMA OF SKIN OF OTHER PARTS OF FACE: ICD-10-CM

## 2024-07-24 DIAGNOSIS — R41.89 OTHER SYMPTOMS AND SIGNS INVOLVING COGNITIVE FUNCTIONS AND AWARENESS: ICD-10-CM

## 2024-08-20 ENCOUNTER — APPOINTMENT (OUTPATIENT)
Dept: DERMATOLOGY | Facility: CLINIC | Age: 69
End: 2024-08-20
Payer: MEDICARE

## 2024-08-20 PROCEDURE — 99213 OFFICE O/P EST LOW 20 MIN: CPT

## 2024-08-20 PROCEDURE — 99203 OFFICE O/P NEW LOW 30 MIN: CPT

## 2024-08-20 NOTE — ASSESSMENT
[FreeTextEntry1] : Alert, oriented, well pleasant.  Sun-exposed cutaneous exam. No evidence of cutaneous malignancy. Well healed scars. No evidence of recurrence. No lymphadenopathy. Brown, yellow papules and plaques generalized. Seborrheic keratoses. No treatment. Incl right cheek and nose. Actinic damage. Reviewed sun protection. Compliant. Extra sunscreen posterior neck.  Follow up 1 year.    Reviewed physical chart.

## 2024-09-13 ENCOUNTER — NON-APPOINTMENT (OUTPATIENT)
Age: 69
End: 2024-09-13

## 2024-09-13 ENCOUNTER — APPOINTMENT (OUTPATIENT)
Dept: INTERNAL MEDICINE | Facility: CLINIC | Age: 69
End: 2024-09-13
Payer: MEDICARE

## 2024-09-13 ENCOUNTER — TRANSCRIPTION ENCOUNTER (OUTPATIENT)
Age: 69
End: 2024-09-13

## 2024-09-13 VITALS
OXYGEN SATURATION: 96 % | HEART RATE: 76 BPM | WEIGHT: 217 LBS | BODY MASS INDEX: 31.07 KG/M2 | DIASTOLIC BLOOD PRESSURE: 78 MMHG | HEIGHT: 70 IN | TEMPERATURE: 97.6 F | RESPIRATION RATE: 16 BRPM | SYSTOLIC BLOOD PRESSURE: 132 MMHG

## 2024-09-13 DIAGNOSIS — E55.9 VITAMIN D DEFICIENCY, UNSPECIFIED: ICD-10-CM

## 2024-09-13 DIAGNOSIS — E78.2 MIXED HYPERLIPIDEMIA: ICD-10-CM

## 2024-09-13 DIAGNOSIS — E11.40 TYPE 2 DIABETES MELLITUS WITH DIABETIC NEUROPATHY, UNSPECIFIED: ICD-10-CM

## 2024-09-13 DIAGNOSIS — I10 ESSENTIAL (PRIMARY) HYPERTENSION: ICD-10-CM

## 2024-09-13 DIAGNOSIS — E66.9 OBESITY, UNSPECIFIED: ICD-10-CM

## 2024-09-13 PROCEDURE — 93000 ELECTROCARDIOGRAM COMPLETE: CPT

## 2024-09-13 PROCEDURE — G0439: CPT

## 2024-09-13 PROCEDURE — G2211 COMPLEX E/M VISIT ADD ON: CPT | Mod: NC

## 2024-09-13 RX ORDER — TIRZEPATIDE 2.5 MG/.5ML
2.5 INJECTION, SOLUTION SUBCUTANEOUS
Qty: 1 | Refills: 1 | Status: ACTIVE | COMMUNITY
Start: 2024-09-13 | End: 1900-01-01

## 2024-09-13 NOTE — HEALTH RISK ASSESSMENT
[Very Good] : ~his/her~  mood as very good [No] : No [Any fall with injury in past year] : Patient reported fall with injury in the past year [0] : 2) Feeling down, depressed, or hopeless: Not at all (0) [PHQ-2 Negative - No further assessment needed] : PHQ-2 Negative - No further assessment needed [Never] : Never [NO] : No [HIV test declined] : HIV test declined [Hepatitis C test declined] : Hepatitis C test declined [None] : None [With Family] : lives with family [Retired] : retired [] :  [Feels Safe at Home] : Feels safe at home [Fully functional (bathing, dressing, toileting, transferring, walking, feeding)] : Fully functional (bathing, dressing, toileting, transferring, walking, feeding) [Fully functional (using the telephone, shopping, preparing meals, housekeeping, doing laundry, using] : Fully functional and needs no help or supervision to perform IADLs (using the telephone, shopping, preparing meals, housekeeping, doing laundry, using transportation, managing medications and managing finances) [de-identified] : not organized exercise [de-identified] : sometimes weird in his words [de-identified] : fx collar bone walking dog  [[id number]] : 0 [Change in mental status noted] : No change in mental status noted [Language] : denies difficulty with language [Behavior] : denies difficulty with behavior [Learning/Retaining New Information] : denies difficulty learning/retaining new information [Handling Complex Tasks] : denies difficulty handling complex tasks [Reasoning] : denies difficulty with reasoning [Spatial Ability and Orientation] : denies difficulty with spatial ability and orientation [Reports changes in hearing] : Reports no changes in hearing [Reports changes in vision] : Reports no changes in vision [Reports changes in dental health] : Reports no changes in dental health [ColonoscopyComments] : NEEDS

## 2025-03-20 RX ORDER — TIRZEPATIDE 2.5 MG/.5ML
2.5 INJECTION, SOLUTION SUBCUTANEOUS
Qty: 12 | Refills: 0 | Status: ACTIVE | COMMUNITY
Start: 2025-03-20 | End: 1900-01-01

## 2025-03-27 ENCOUNTER — APPOINTMENT (OUTPATIENT)
Dept: DERMATOLOGY | Facility: CLINIC | Age: 70
End: 2025-03-27
Payer: MEDICARE

## 2025-03-27 DIAGNOSIS — D48.5 NEOPLASM OF UNCERTAIN BEHAVIOR OF SKIN: ICD-10-CM

## 2025-03-27 PROCEDURE — 11102 TANGNTL BX SKIN SINGLE LES: CPT

## 2025-04-01 ENCOUNTER — NON-APPOINTMENT (OUTPATIENT)
Age: 70
End: 2025-04-01

## 2025-04-03 ENCOUNTER — NON-APPOINTMENT (OUTPATIENT)
Age: 70
End: 2025-04-03

## 2025-04-03 LAB — CORE LAB BIOPSY: NORMAL

## 2025-04-15 NOTE — ED ADULT NURSE NOTE - PRIMARY CARE PROVIDER
reyes My signature below certifies that the above stated patient is homebound and upon completion of the Face-To-Face encounter, has the need for intermittent skilled nursing, physical therapy and/or speech or occupational therapy services in their home for their current diagnosis as outlined in their initial plan of care. These services will continue to be monitored by myself or another physician.

## 2025-04-22 ENCOUNTER — APPOINTMENT (OUTPATIENT)
Dept: DERMATOLOGY | Facility: CLINIC | Age: 70
End: 2025-04-22
Payer: MEDICARE

## 2025-04-22 DIAGNOSIS — D04.30 CARCINOMA IN SITU OF SKIN OF UNSPECIFIED PART OF FACE: ICD-10-CM

## 2025-04-22 PROCEDURE — 17283 DSTR MAL LS F/E/E/N/L/M2.1-3: CPT

## 2025-05-28 ENCOUNTER — APPOINTMENT (OUTPATIENT)
Dept: DERMATOLOGY | Facility: CLINIC | Age: 70
End: 2025-05-28
Payer: MEDICARE

## 2025-05-28 DIAGNOSIS — Z85.828 PERSONAL HISTORY OF OTHER MALIGNANT NEOPLASM OF SKIN: ICD-10-CM

## 2025-05-28 DIAGNOSIS — Z86.008 PERSONAL HISTORY OF IN-SITU NEOPLASM OF OTHER SITE: ICD-10-CM

## 2025-05-28 DIAGNOSIS — L57.8 OTHER SKIN CHANGES DUE TO CHRONIC EXPOSURE TO NONIONIZING RADIATION: ICD-10-CM

## 2025-05-28 DIAGNOSIS — L82.1 OTHER SEBORRHEIC KERATOSIS: ICD-10-CM

## 2025-05-28 DIAGNOSIS — L57.0 ACTINIC KERATOSIS: ICD-10-CM

## 2025-05-28 PROCEDURE — 99212 OFFICE O/P EST SF 10 MIN: CPT

## 2025-06-02 ENCOUNTER — RX RENEWAL (OUTPATIENT)
Age: 70
End: 2025-06-02

## 2025-06-04 NOTE — ED ADULT TRIAGE NOTE - AS HEIGHT TYPE
Received Progress notes from [facility] from 5/31/2025. Placed in JL bin for signature, needs to be faxed back. [phone]   stated

## 2025-06-24 ENCOUNTER — RX RENEWAL (OUTPATIENT)
Age: 70
End: 2025-06-24

## 2025-08-07 ENCOUNTER — RX RENEWAL (OUTPATIENT)
Age: 70
End: 2025-08-07

## 2025-08-29 ENCOUNTER — RX RENEWAL (OUTPATIENT)
Age: 70
End: 2025-08-29

## 2025-09-09 ENCOUNTER — APPOINTMENT (OUTPATIENT)
Dept: DERMATOLOGY | Facility: CLINIC | Age: 70
End: 2025-09-09
Payer: MEDICARE

## 2025-09-09 DIAGNOSIS — Z85.828 PERSONAL HISTORY OF OTHER MALIGNANT NEOPLASM OF SKIN: ICD-10-CM

## 2025-09-09 DIAGNOSIS — Z86.03 PERSONAL HISTORY OF NEOPLASM OF UNCERTAIN BEHAVIOR: ICD-10-CM

## 2025-09-09 DIAGNOSIS — L82.1 OTHER SEBORRHEIC KERATOSIS: ICD-10-CM

## 2025-09-09 DIAGNOSIS — L57.8 OTHER SKIN CHANGES DUE TO CHRONIC EXPOSURE TO NONIONIZING RADIATION: ICD-10-CM

## 2025-09-09 DIAGNOSIS — Z86.008 PERSONAL HISTORY OF IN-SITU NEOPLASM OF OTHER SITE: ICD-10-CM

## 2025-09-09 DIAGNOSIS — L57.0 ACTINIC KERATOSIS: ICD-10-CM

## 2025-09-09 PROCEDURE — 17000 DESTRUCT PREMALG LESION: CPT

## 2025-09-09 PROCEDURE — 99213 OFFICE O/P EST LOW 20 MIN: CPT | Mod: 25

## 2025-09-09 PROCEDURE — 17003 DESTRUCT PREMALG LES 2-14: CPT

## 2025-09-17 ENCOUNTER — NON-APPOINTMENT (OUTPATIENT)
Age: 70
End: 2025-09-17

## 2025-09-17 DIAGNOSIS — L23.7 ALLERGIC CONTACT DERMATITIS DUE TO PLANTS, EXCEPT FOOD: ICD-10-CM

## 2025-09-17 RX ORDER — BETAMETHASONE DIPROPIONATE 0.5 MG/G
0.05 CREAM, AUGMENTED TOPICAL TWICE DAILY
Qty: 1 | Refills: 1 | Status: ACTIVE | COMMUNITY
Start: 2025-09-17 | End: 1900-01-01